# Patient Record
Sex: FEMALE | Race: WHITE | Employment: OTHER | URBAN - METROPOLITAN AREA
[De-identification: names, ages, dates, MRNs, and addresses within clinical notes are randomized per-mention and may not be internally consistent; named-entity substitution may affect disease eponyms.]

---

## 2017-04-07 ENCOUNTER — HOSPITAL ENCOUNTER (OUTPATIENT)
Dept: LAB | Age: 82
Discharge: HOME | End: 2017-04-07
Payer: MEDICARE

## 2017-04-07 ENCOUNTER — HOSPITAL ENCOUNTER (OUTPATIENT)
Dept: ULTRASOUND IMAGING | Age: 82
Discharge: HOME | End: 2017-04-07
Attending: INTERNAL MEDICINE
Payer: MEDICARE

## 2017-04-07 DIAGNOSIS — N18.30 CHRONIC KIDNEY DISEASE, STAGE III (MODERATE) (HCC): ICD-10-CM

## 2017-04-07 LAB
ALBUMIN SERPL BCP-MCNC: 3.5 G/DL (ref 3.4–5)
ANION GAP BLD CALC-SCNC: 4 MMOL/L (ref 3–18)
BUN SERPL-MCNC: 22 MG/DL (ref 7–18)
BUN/CREAT SERPL: 18 (ref 12–20)
CALCIUM SERPL-MCNC: 8.5 MG/DL (ref 8.5–10.1)
CALCIUM SERPL-MCNC: 8.7 MG/DL (ref 8.5–10.1)
CHLORIDE SERPL-SCNC: 103 MMOL/L (ref 100–108)
CO2 SERPL-SCNC: 31 MMOL/L (ref 21–32)
CREAT SERPL-MCNC: 1.23 MG/DL (ref 0.6–1.3)
GLUCOSE SERPL-MCNC: 88 MG/DL (ref 74–99)
HCT VFR BLD AUTO: 28 % (ref 35–45)
HGB BLD-MCNC: 8.5 G/DL (ref 12–16)
IRON SATN MFR SERPL: 3 %
IRON SERPL-MCNC: 13 UG/DL (ref 50–175)
PHOSPHATE SERPL-MCNC: 3.4 MG/DL (ref 2.5–4.9)
POTASSIUM SERPL-SCNC: 4.5 MMOL/L (ref 3.5–5.5)
PTH-INTACT SERPL-MCNC: 49.8 PG/ML (ref 14–72)
SODIUM SERPL-SCNC: 138 MMOL/L (ref 136–145)
TIBC SERPL-MCNC: 387 UG/DL (ref 250–450)

## 2017-04-07 PROCEDURE — 76770 US EXAM ABDO BACK WALL COMP: CPT

## 2017-04-20 ENCOUNTER — HOSPITAL ENCOUNTER (OUTPATIENT)
Dept: INFUSION THERAPY | Age: 82
Discharge: HOME | End: 2017-04-20
Payer: MEDICARE

## 2017-04-20 VITALS
HEART RATE: 75 BPM | TEMPERATURE: 98.3 F | DIASTOLIC BLOOD PRESSURE: 65 MMHG | RESPIRATION RATE: 16 BRPM | SYSTOLIC BLOOD PRESSURE: 121 MMHG | OXYGEN SATURATION: 100 %

## 2017-04-20 PROCEDURE — 96365 THER/PROPH/DIAG IV INF INIT: CPT

## 2017-04-20 PROCEDURE — 74011250636 HC RX REV CODE- 250/636: Performed by: INTERNAL MEDICINE

## 2017-04-20 PROCEDURE — 96366 THER/PROPH/DIAG IV INF ADDON: CPT

## 2017-04-20 RX ORDER — BISMUTH SUBSALICYLATE 262 MG
1 TABLET,CHEWABLE ORAL DAILY
COMMUNITY

## 2017-04-20 RX ORDER — TRAMADOL HYDROCHLORIDE 50 MG/1
50 TABLET ORAL
COMMUNITY
End: 2021-06-03

## 2017-04-20 RX ORDER — ZOLPIDEM TARTRATE 10 MG/1
10 TABLET ORAL
COMMUNITY
End: 2017-12-18

## 2017-04-20 RX ORDER — SODIUM CHLORIDE 0.9 % (FLUSH) 0.9 %
10-40 SYRINGE (ML) INJECTION AS NEEDED
Status: DISPENSED | OUTPATIENT
Start: 2017-04-20 | End: 2017-04-20

## 2017-04-20 RX ORDER — GLUCOSAMINE/CHONDR SU A SOD 750-600 MG
5000 TABLET ORAL
COMMUNITY

## 2017-04-20 RX ORDER — SODIUM CHLORIDE 9 MG/ML
250 INJECTION, SOLUTION INTRAVENOUS ONCE
Status: COMPLETED | OUTPATIENT
Start: 2017-04-20 | End: 2017-04-20

## 2017-04-20 RX ORDER — CHOLECALCIFEROL TAB 125 MCG (5000 UNIT) 125 MCG
5000 TAB ORAL DAILY
COMMUNITY

## 2017-04-20 RX ADMIN — Medication 10 ML: at 08:45

## 2017-04-20 RX ADMIN — SODIUM CHLORIDE 250 ML: 900 INJECTION, SOLUTION INTRAVENOUS at 08:55

## 2017-04-20 RX ADMIN — IRON SUCROSE 300 MG: 20 INJECTION, SOLUTION INTRAVENOUS at 09:01

## 2017-04-20 RX ADMIN — Medication 10 ML: at 11:01

## 2017-04-20 NOTE — PROGRESS NOTES
[facility] OPIC Progress Note    Date: 2017    Name: Davidosn Aguirre    MRN: [id number]         : 10/23/1931    Venofer Infusion    Ms. Mack Rowan to [facility], ambulatory, at 0830. Pt was assessed and education was provided. Education given to patient reguarding the medication, care notes were given to patient and were signed. Ms. Ghazal Murcia vitals were reviewed and patient was observed for 5 minutes prior to treatment. Visit Vitals    /57 (BP 1 Location: Right arm, BP Patient Position: Sitting)    Pulse 86    Temp 98.2 °F (36.8 °C)    Resp 16    Breastfeeding No         24 g PIV placed in right arm x 1 attempt. PIV flushed easily and had brisk blood return. NS was started at [facility] and Venofer 300 mg was initiated @ 167 ml/hr. 15 minutes into infusion, VS stable and pt denied complaints of itching, lip/tongue/facial swelling, SOB, CP or other complaints. Ms. Mack Rowan tolerated the infusion, and had no complaints. VS remained stable. PIV flushed with NS 10 ml and removed. No bleeding or hematoma noted at site. Guaze and coband applied. Reviewed discharge instructions with patient, including expected side effects (abdominal cramping, nausea, changes in color of urine or feces) and signs of allergic reaction requiring medical attention (itching/hives/rashes, SOB, chest pain, lip/tongue/facial swelling). Patient given printed copy to take home. Patient verbalized understanding of discharge instructions. Patient refused to stay for an observation period. Patient Vitals for the past 12 hrs:   Temp Pulse Resp BP SpO2   17 1100 98.3 °F (36.8 °C) 75 16 121/65 100 %   17 0833 98.2 °F (36.8 °C) 86 16 123/57 -     Patient armband removed and shredded. Ms. Mack Rowan was discharged from [place] [address] in stable condition at 1110. She is to return to [facility] on 2017 at 0800 for her next Venofer infusion.      Robbie Hoff RN  2017

## 2017-05-03 RX ORDER — SODIUM CHLORIDE 0.9 % (FLUSH) 0.9 %
10-40 SYRINGE (ML) INJECTION AS NEEDED
Status: DISPENSED | OUTPATIENT
Start: 2017-05-03 | End: 2017-05-04

## 2017-05-04 ENCOUNTER — HOSPITAL ENCOUNTER (OUTPATIENT)
Dept: INFUSION THERAPY | Age: 82
Discharge: HOME | End: 2017-05-04
Payer: MEDICARE

## 2017-05-04 VITALS
RESPIRATION RATE: 18 BRPM | SYSTOLIC BLOOD PRESSURE: 104 MMHG | DIASTOLIC BLOOD PRESSURE: 60 MMHG | HEART RATE: 71 BPM | OXYGEN SATURATION: 99 % | TEMPERATURE: 98 F

## 2017-05-04 PROCEDURE — 96366 THER/PROPH/DIAG IV INF ADDON: CPT

## 2017-05-04 PROCEDURE — 74011250636 HC RX REV CODE- 250/636: Performed by: INTERNAL MEDICINE

## 2017-05-04 PROCEDURE — 96365 THER/PROPH/DIAG IV INF INIT: CPT

## 2017-05-04 RX ORDER — SODIUM CHLORIDE 0.9 % (FLUSH) 0.9 %
10-40 SYRINGE (ML) INJECTION AS NEEDED
Status: DISCONTINUED | OUTPATIENT
Start: 2017-05-04 | End: 2017-05-08 | Stop reason: HOSPADM

## 2017-05-04 RX ADMIN — Medication 10 ML: at 09:48

## 2017-05-04 RX ADMIN — IRON SUCROSE 300 MG: 20 INJECTION, SOLUTION INTRAVENOUS at 08:15

## 2017-05-04 RX ADMIN — Medication 10 ML: at 08:12

## 2017-05-08 ENCOUNTER — HOSPITAL ENCOUNTER (OUTPATIENT)
Dept: LAB | Age: 82
Discharge: HOME | End: 2017-05-08
Payer: MEDICARE

## 2017-05-08 LAB
ALBUMIN SERPL BCP-MCNC: 3.6 G/DL (ref 3.4–5)
ANION GAP BLD CALC-SCNC: 7 MMOL/L (ref 3–18)
BUN SERPL-MCNC: 25 MG/DL (ref 7–18)
BUN/CREAT SERPL: 18 (ref 12–20)
CALCIUM SERPL-MCNC: 10 MG/DL (ref 8.5–10.1)
CALCIUM SERPL-MCNC: 9.9 MG/DL (ref 8.5–10.1)
CHLORIDE SERPL-SCNC: 103 MMOL/L (ref 100–108)
CO2 SERPL-SCNC: 30 MMOL/L (ref 21–32)
CREAT SERPL-MCNC: 1.39 MG/DL (ref 0.6–1.3)
GLUCOSE SERPL-MCNC: 88 MG/DL (ref 74–99)
HCT VFR BLD AUTO: 36.3 % (ref 35–45)
HGB BLD-MCNC: 11 G/DL (ref 12–16)
IRON SATN MFR SERPL: 17 %
IRON SERPL-MCNC: 51 UG/DL (ref 50–175)
PHOSPHATE SERPL-MCNC: 3.5 MG/DL (ref 2.5–4.9)
POTASSIUM SERPL-SCNC: 5.3 MMOL/L (ref 3.5–5.5)
PTH-INTACT SERPL-MCNC: 28.7 PG/ML (ref 14–72)
SODIUM SERPL-SCNC: 140 MMOL/L (ref 136–145)
TIBC SERPL-MCNC: 302 UG/DL (ref 250–450)

## 2017-05-08 PROCEDURE — 83550 IRON BINDING TEST: CPT | Performed by: INTERNAL MEDICINE

## 2017-05-08 PROCEDURE — 83970 ASSAY OF PARATHORMONE: CPT | Performed by: INTERNAL MEDICINE

## 2017-05-08 PROCEDURE — 80069 RENAL FUNCTION PANEL: CPT | Performed by: INTERNAL MEDICINE

## 2017-05-08 PROCEDURE — 36415 COLL VENOUS BLD VENIPUNCTURE: CPT | Performed by: INTERNAL MEDICINE

## 2017-05-08 PROCEDURE — 85018 HEMOGLOBIN: CPT | Performed by: INTERNAL MEDICINE

## 2017-05-18 ENCOUNTER — HOSPITAL ENCOUNTER (OUTPATIENT)
Dept: INFUSION THERAPY | Age: 82
Discharge: HOME | End: 2017-05-18
Payer: MEDICARE

## 2017-05-18 VITALS
DIASTOLIC BLOOD PRESSURE: 62 MMHG | SYSTOLIC BLOOD PRESSURE: 107 MMHG | RESPIRATION RATE: 18 BRPM | TEMPERATURE: 98.1 F | HEART RATE: 83 BPM | OXYGEN SATURATION: 99 %

## 2017-05-18 PROCEDURE — 96366 THER/PROPH/DIAG IV INF ADDON: CPT

## 2017-05-18 PROCEDURE — 74011250636 HC RX REV CODE- 250/636: Performed by: INTERNAL MEDICINE

## 2017-05-18 PROCEDURE — 96365 THER/PROPH/DIAG IV INF INIT: CPT

## 2017-05-18 RX ORDER — SODIUM CHLORIDE 0.9 % (FLUSH) 0.9 %
10-40 SYRINGE (ML) INJECTION AS NEEDED
Status: DISCONTINUED | OUTPATIENT
Start: 2017-05-18 | End: 2017-05-22 | Stop reason: HOSPADM

## 2017-05-18 RX ORDER — LANOLIN ALCOHOL/MO/W.PET/CERES
65 CREAM (GRAM) TOPICAL
COMMUNITY

## 2017-05-18 RX ADMIN — IRON SUCROSE 400 MG: 20 INJECTION, SOLUTION INTRAVENOUS at 08:16

## 2017-05-18 RX ADMIN — Medication 10 ML: at 08:13

## 2017-05-18 RX ADMIN — Medication 10 ML: at 10:54

## 2017-05-18 NOTE — PROGRESS NOTES
[facility] Progress Note    Date: May 18, 2017    Name: Ton Jennings    MRN: [id number]         : 10/23/1931    Venofer Infusion    Ms. Jael Adler to [facility], ambulatory, at 4481. Pt was assessed and education was provided. Education given to patient reguarding the medication. Ms. Argentina Madison vitals were reviewed and patient was observed for 5 minutes prior to treatment. Visit Vitals    /62 (BP 1 Location: Right arm, BP Patient Position: Sitting)    Pulse 83    Temp 98.1 °F (36.7 °C)    Resp 18    SpO2 99%    Breastfeeding No         24 g PIV placed in left arm x 1 attempt. PIV flushed easily and had brisk blood return. Venofer 400 mg was initiated @ 100 ml/hr and ran over 2.5 hours. 15 minutes into infusion, VS stable and pt denied complaints of itching, lip/tongue/facial swelling, SOB, CP or other complaints. Pt became very upset and said \"I'm tired of sitting here and I am ready to go now. \"  She refused to stay to receive the final 30 ml of her Venofer infusion. Ms. Jael Adler tolerated the infusion. Pt refused to have her vitals taken after the infusion was stopped prior to discharge. PIV flushed with NS 10 ml and removed. No bleeding or hematoma noted at site. Guaze and tape applied. Reviewed discharge instructions with patient, including expected side effects (abdominal cramping, nausea, changes in color of urine or feces) and signs of allergic reaction requiring medical attention (itching/hives/rashes, SOB, chest pain, lip/tongue/facial swelling). Patient given printed copy to take home. Patient verbalized understanding of discharge instructions. Patient refused to stay for an observation period. Patient armband removed and shredded. Ms. Jael Adler was discharged from [place] [address] in stable condition at 1055. She has no future appointments with [facility] at this time.      Kalli Forde RN  May 18, 2017

## 2017-09-08 ENCOUNTER — HOSPITAL ENCOUNTER (OUTPATIENT)
Dept: ULTRASOUND IMAGING | Age: 82
Discharge: HOME | End: 2017-09-08
Attending: INTERNAL MEDICINE
Payer: MEDICARE

## 2017-09-08 DIAGNOSIS — N18.30 CHRONIC KIDNEY DISEASE, STAGE III (MODERATE) (HCC): ICD-10-CM

## 2017-09-08 PROCEDURE — 76770 US EXAM ABDO BACK WALL COMP: CPT

## 2017-12-18 PROBLEM — N28.1 COMPLEX RENAL CYST: Status: ACTIVE | Noted: 2017-12-18

## 2019-09-05 ENCOUNTER — HOSPITAL ENCOUNTER (OUTPATIENT)
Dept: ULTRASOUND IMAGING | Age: 84
Discharge: HOME | End: 2019-09-05
Attending: INTERNAL MEDICINE
Payer: MEDICARE

## 2019-09-05 DIAGNOSIS — R10.9 ABDOMINAL PAIN: ICD-10-CM

## 2019-09-05 PROCEDURE — 76700 US EXAM ABDOM COMPLETE: CPT

## 2021-05-25 ENCOUNTER — HOSPITAL ENCOUNTER (INPATIENT)
Age: 86
LOS: 2 days | Discharge: PSYCHIATRIC OR SUBSTANCE USE FACILITY | DRG: 917 | End: 2021-05-27
Attending: EMERGENCY MEDICINE | Admitting: HOSPITALIST
Payer: MEDICARE

## 2021-05-25 ENCOUNTER — APPOINTMENT (OUTPATIENT)
Dept: CT IMAGING | Age: 86
DRG: 917 | End: 2021-05-25
Attending: STUDENT IN AN ORGANIZED HEALTH CARE EDUCATION/TRAINING PROGRAM
Payer: MEDICARE

## 2021-05-25 ENCOUNTER — APPOINTMENT (OUTPATIENT)
Dept: GENERAL RADIOLOGY | Age: 86
DRG: 917 | End: 2021-05-25
Attending: EMERGENCY MEDICINE
Payer: MEDICARE

## 2021-05-25 DIAGNOSIS — G93.41 ACUTE METABOLIC ENCEPHALOPATHY: ICD-10-CM

## 2021-05-25 DIAGNOSIS — M25.551 RIGHT HIP PAIN: Primary | ICD-10-CM

## 2021-05-25 DIAGNOSIS — T14.91XA SUICIDE ATTEMPT (HCC): ICD-10-CM

## 2021-05-25 LAB
ALBUMIN SERPL-MCNC: 2.9 G/DL (ref 3.4–5)
ALBUMIN/GLOB SERPL: 0.7 {RATIO} (ref 0.8–1.7)
ALP SERPL-CCNC: 73 U/L (ref 45–117)
ALT SERPL-CCNC: 15 U/L (ref 13–56)
AMPHET UR QL SCN: NEGATIVE
ANION GAP BLD CALC-SCNC: 12 MMOL/L (ref 10–20)
ANION GAP SERPL CALC-SCNC: 5 MMOL/L (ref 3–18)
APAP SERPL-MCNC: 12 UG/ML (ref 10–30)
APAP SERPL-MCNC: 4 UG/ML (ref 10–30)
APPEARANCE UR: CLEAR
APTT PPP: 31.7 SEC (ref 23–36.4)
ARTERIAL PATENCY WRIST A: ABNORMAL
AST SERPL-CCNC: 13 U/L (ref 10–38)
BACTERIA URNS QL MICRO: ABNORMAL /HPF
BARBITURATES UR QL SCN: NEGATIVE
BASE DEFICIT BLD-SCNC: 2.8 MMOL/L
BASOPHILS # BLD: 0.1 K/UL (ref 0–0.1)
BASOPHILS NFR BLD: 1 % (ref 0–2)
BDY SITE: ABNORMAL
BENZODIAZ UR QL: POSITIVE
BILIRUB SERPL-MCNC: 0.2 MG/DL (ref 0.2–1)
BILIRUB UR QL: NEGATIVE
BUN SERPL-MCNC: 15 MG/DL (ref 7–18)
BUN/CREAT SERPL: 20 (ref 12–20)
CA-I BLD-MCNC: 1.1 MMOL/L (ref 1.12–1.32)
CALCIUM SERPL-MCNC: 7.8 MG/DL (ref 8.5–10.1)
CANNABINOIDS UR QL SCN: NEGATIVE
CHLORIDE BLD-SCNC: 98 MMOL/L (ref 98–107)
CHLORIDE SERPL-SCNC: 103 MMOL/L (ref 100–111)
CO2 BLD-SCNC: 22 MMOL/L (ref 19–24)
CO2 SERPL-SCNC: 21 MMOL/L (ref 21–32)
COCAINE UR QL SCN: NEGATIVE
COLOR UR: YELLOW
CREAT BLD-MCNC: 1.04 MG/DL (ref 0.6–1.3)
CREAT SERPL-MCNC: 0.74 MG/DL (ref 0.6–1.3)
DIFFERENTIAL METHOD BLD: ABNORMAL
EOSINOPHIL # BLD: 0.1 K/UL (ref 0–0.4)
EOSINOPHIL NFR BLD: 2 % (ref 0–5)
EPITH CASTS URNS QL MICRO: ABNORMAL /LPF (ref 0–5)
ERYTHROCYTE [DISTWIDTH] IN BLOOD BY AUTOMATED COUNT: 17 % (ref 11.6–14.5)
GAS FLOW.O2 O2 DELIVERY SYS: ABNORMAL L/MIN
GLOBULIN SER CALC-MCNC: 3.9 G/DL (ref 2–4)
GLUCOSE BLD-MCNC: 89 MG/DL (ref 65–100)
GLUCOSE SERPL-MCNC: 87 MG/DL (ref 74–99)
GLUCOSE UR STRIP.AUTO-MCNC: NEGATIVE MG/DL
HCO3 BLD-SCNC: 21.3 MMOL/L (ref 22–26)
HCT VFR BLD AUTO: 31.2 % (ref 35–45)
HDSCOM,HDSCOM: ABNORMAL
HGB BLD-MCNC: 10.4 G/DL (ref 12–16)
HGB UR QL STRIP: ABNORMAL
INR PPP: 1 (ref 0.8–1.2)
KETONES UR QL STRIP.AUTO: NEGATIVE MG/DL
LACTATE BLD-SCNC: 0.4 MMOL/L (ref 0.4–2)
LEUKOCYTE ESTERASE UR QL STRIP.AUTO: NEGATIVE
LYMPHOCYTES # BLD: 0.7 K/UL (ref 0.9–3.6)
LYMPHOCYTES NFR BLD: 10 % (ref 21–52)
MAGNESIUM SERPL-MCNC: 2.2 MG/DL (ref 1.6–2.6)
MCH RBC QN AUTO: 28 PG (ref 24–34)
MCHC RBC AUTO-ENTMCNC: 33.3 G/DL (ref 31–37)
MCV RBC AUTO: 83.9 FL (ref 74–97)
METHADONE UR QL: NEGATIVE
MONOCYTES # BLD: 0.5 K/UL (ref 0.05–1.2)
MONOCYTES NFR BLD: 8 % (ref 3–10)
NEUTS SEG # BLD: 5.5 K/UL (ref 1.8–8)
NEUTS SEG NFR BLD: 79 % (ref 40–73)
NITRITE UR QL STRIP.AUTO: NEGATIVE
OPIATES UR QL: NEGATIVE
PCO2 BLD: 33.5 MMHG (ref 35–45)
PCP UR QL: NEGATIVE
PH BLD: 7.41 [PH] (ref 7.35–7.45)
PH UR STRIP: 5.5 [PH] (ref 5–8)
PHOSPHATE SERPL-MCNC: 3.6 MG/DL (ref 2.5–4.9)
PLATELET # BLD AUTO: 273 K/UL (ref 135–420)
PMV BLD AUTO: 8.5 FL (ref 9.2–11.8)
PO2 BLD: 311 MMHG (ref 80–100)
POTASSIUM BLD-SCNC: 4.5 MMOL/L (ref 3.5–5.1)
POTASSIUM SERPL-SCNC: 4.4 MMOL/L (ref 3.5–5.5)
PROT SERPL-MCNC: 6.8 G/DL (ref 6.4–8.2)
PROT UR STRIP-MCNC: NEGATIVE MG/DL
PROTHROMBIN TIME: 13.3 SEC (ref 11.5–15.2)
RBC # BLD AUTO: 3.72 M/UL (ref 4.2–5.3)
RBC #/AREA URNS HPF: ABNORMAL /HPF (ref 0–5)
SALICYLATES SERPL-MCNC: <1.7 MG/DL (ref 2.8–20)
SALICYLATES SERPL-MCNC: <1.7 MG/DL (ref 2.8–20)
SAO2 % BLD: 100 %
SERVICE CMNT-IMP: ABNORMAL
SODIUM BLD-SCNC: 131 MMOL/L (ref 136–145)
SODIUM SERPL-SCNC: 129 MMOL/L (ref 136–145)
SODIUM UR-SCNC: 59 MMOL/L (ref 20–110)
SP GR UR REFRACTOMETRY: 1.01 (ref 1–1.03)
SPECIMEN SITE: ABNORMAL
TSH SERPL DL<=0.05 MIU/L-ACNC: 0.62 UIU/ML (ref 0.36–3.74)
UROBILINOGEN UR QL STRIP.AUTO: 0.2 EU/DL (ref 0.2–1)
WBC # BLD AUTO: 6.9 K/UL (ref 4.6–13.2)

## 2021-05-25 PROCEDURE — 99223 1ST HOSP IP/OBS HIGH 75: CPT | Performed by: HOSPITALIST

## 2021-05-25 PROCEDURE — 83935 ASSAY OF URINE OSMOLALITY: CPT

## 2021-05-25 PROCEDURE — 99285 EMERGENCY DEPT VISIT HI MDM: CPT

## 2021-05-25 PROCEDURE — 85610 PROTHROMBIN TIME: CPT

## 2021-05-25 PROCEDURE — 80143 DRUG ASSAY ACETAMINOPHEN: CPT

## 2021-05-25 PROCEDURE — 85730 THROMBOPLASTIN TIME PARTIAL: CPT

## 2021-05-25 PROCEDURE — 84295 ASSAY OF SERUM SODIUM: CPT

## 2021-05-25 PROCEDURE — 93005 ELECTROCARDIOGRAM TRACING: CPT

## 2021-05-25 PROCEDURE — 74011250636 HC RX REV CODE- 250/636: Performed by: EMERGENCY MEDICINE

## 2021-05-25 PROCEDURE — 81001 URINALYSIS AUTO W/SCOPE: CPT

## 2021-05-25 PROCEDURE — 65660000004 HC RM CVT STEPDOWN

## 2021-05-25 PROCEDURE — 83735 ASSAY OF MAGNESIUM: CPT

## 2021-05-25 PROCEDURE — APPSS60 APP SPLIT SHARED TIME 46-60 MINUTES: Performed by: NURSE PRACTITIONER

## 2021-05-25 PROCEDURE — 84100 ASSAY OF PHOSPHORUS: CPT

## 2021-05-25 PROCEDURE — 77010033678 HC OXYGEN DAILY

## 2021-05-25 PROCEDURE — 71045 X-RAY EXAM CHEST 1 VIEW: CPT

## 2021-05-25 PROCEDURE — 85025 COMPLETE CBC W/AUTO DIFF WBC: CPT

## 2021-05-25 PROCEDURE — 80053 COMPREHEN METABOLIC PANEL: CPT

## 2021-05-25 PROCEDURE — 94762 N-INVAS EAR/PLS OXIMTRY CONT: CPT

## 2021-05-25 PROCEDURE — 80179 DRUG ASSAY SALICYLATE: CPT

## 2021-05-25 PROCEDURE — 2709999900 HC NON-CHARGEABLE SUPPLY

## 2021-05-25 PROCEDURE — 70450 CT HEAD/BRAIN W/O DYE: CPT

## 2021-05-25 PROCEDURE — 74011250636 HC RX REV CODE- 250/636: Performed by: NURSE PRACTITIONER

## 2021-05-25 PROCEDURE — 83930 ASSAY OF BLOOD OSMOLALITY: CPT

## 2021-05-25 PROCEDURE — 84443 ASSAY THYROID STIM HORMONE: CPT

## 2021-05-25 PROCEDURE — 84300 ASSAY OF URINE SODIUM: CPT

## 2021-05-25 PROCEDURE — 80307 DRUG TEST PRSMV CHEM ANLYZR: CPT

## 2021-05-25 PROCEDURE — 36600 WITHDRAWAL OF ARTERIAL BLOOD: CPT

## 2021-05-25 RX ORDER — SODIUM CHLORIDE 0.9 % (FLUSH) 0.9 %
5-40 SYRINGE (ML) INJECTION EVERY 8 HOURS
Status: DISCONTINUED | OUTPATIENT
Start: 2021-05-25 | End: 2021-05-27 | Stop reason: HOSPADM

## 2021-05-25 RX ORDER — ACETAMINOPHEN 650 MG/1
650 SUPPOSITORY RECTAL
Status: DISCONTINUED | OUTPATIENT
Start: 2021-05-25 | End: 2021-05-27 | Stop reason: HOSPADM

## 2021-05-25 RX ORDER — ENOXAPARIN SODIUM 100 MG/ML
40 INJECTION SUBCUTANEOUS DAILY
Status: DISCONTINUED | OUTPATIENT
Start: 2021-05-26 | End: 2021-05-27 | Stop reason: HOSPADM

## 2021-05-25 RX ORDER — ACETAMINOPHEN 325 MG/1
650 TABLET ORAL
Status: DISCONTINUED | OUTPATIENT
Start: 2021-05-25 | End: 2021-05-27 | Stop reason: HOSPADM

## 2021-05-25 RX ORDER — HYDRALAZINE HYDROCHLORIDE 20 MG/ML
10 INJECTION INTRAMUSCULAR; INTRAVENOUS
Status: DISCONTINUED | OUTPATIENT
Start: 2021-05-25 | End: 2021-05-27 | Stop reason: HOSPADM

## 2021-05-25 RX ORDER — ONDANSETRON 2 MG/ML
4 INJECTION INTRAMUSCULAR; INTRAVENOUS
Status: DISCONTINUED | OUTPATIENT
Start: 2021-05-25 | End: 2021-05-27 | Stop reason: HOSPADM

## 2021-05-25 RX ORDER — SODIUM CHLORIDE 9 MG/ML
125 INJECTION, SOLUTION INTRAVENOUS CONTINUOUS
Status: DISCONTINUED | OUTPATIENT
Start: 2021-05-25 | End: 2021-05-26

## 2021-05-25 RX ORDER — SODIUM CHLORIDE 0.9 % (FLUSH) 0.9 %
5-40 SYRINGE (ML) INJECTION AS NEEDED
Status: DISCONTINUED | OUTPATIENT
Start: 2021-05-25 | End: 2021-05-27 | Stop reason: HOSPADM

## 2021-05-25 RX ORDER — POLYETHYLENE GLYCOL 3350 17 G/17G
17 POWDER, FOR SOLUTION ORAL DAILY PRN
Status: DISCONTINUED | OUTPATIENT
Start: 2021-05-25 | End: 2021-05-27 | Stop reason: HOSPADM

## 2021-05-25 RX ORDER — PROMETHAZINE HYDROCHLORIDE 12.5 MG/1
12.5 TABLET ORAL
Status: DISCONTINUED | OUTPATIENT
Start: 2021-05-25 | End: 2021-05-27 | Stop reason: HOSPADM

## 2021-05-25 RX ADMIN — SODIUM CHLORIDE 1000 ML: 900 INJECTION, SOLUTION INTRAVENOUS at 14:57

## 2021-05-25 RX ADMIN — SODIUM CHLORIDE 125 ML/HR: 900 INJECTION, SOLUTION INTRAVENOUS at 16:09

## 2021-05-25 RX ADMIN — HYDRALAZINE HYDROCHLORIDE 10 MG: 20 INJECTION, SOLUTION INTRAMUSCULAR; INTRAVENOUS at 20:08

## 2021-05-25 NOTE — ED NOTES
Patient appears to be more calm when staff is not working on her. Staff needs to help her stay covered with blankets.  No family at the bedside at this time

## 2021-05-25 NOTE — ED NOTES
Notified Dr. Angela Danielle that patient has 3 visitors at bedside d/t family needing to discuss patient's code status.

## 2021-05-25 NOTE — ED PROVIDER NOTES
80-year-old female with past medical history of breast cancer and chronic lower extremity pain brought to the emergency department via EMS after her family called secondary to the patient calling her daughter-in-law and stating that she felt as though she was going to die and would not make it. EMS arrived on scene and reports that patient was sitting in a chair unresponsive with all of her medications lined up on the table next to her and a note stating that she can no longer take the pain and that what she has been given in terms of medications is no longer helping; take care of her animal and if possible to rehome it with an older person. Patient was initially bagged on scene secondary to respiratory depression. Patient was given 1 mg IN in Narcan and 1 mg IV Narcan in route with minimal improvement in her responsiveness. Patient arrived in the emergency department on 15 L nonrebreather with GCS of 7. Past Medical History:   Diagnosis Date    Arthritis     Asthma     Cancer ([facility] [address]     hx of breast cancer    Disorder     scoliosis    Other ill-defined conditions(799.89)     osteoporosis,        Past Surgical History:   Procedure Laterality Date    BREAST SURGERY PROCEDURE UNLISTED      mastectomy - left    HX HEENT      cataract surgery with lens replacement         Family History:   Problem Relation Age of Onset    Cancer Neg Hx     Diabetes Neg Hx     Heart Disease Neg Hx     Hypertension Neg Hx     Stroke Neg Hx        Social History     Socioeconomic History    Marital status:      Spouse name: Not on file    Number of children: Not on file    Years of education: Not on file    Highest education level: Not on file   Occupational History    Not on file   Tobacco Use    Smoking status: Never Smoker    Smokeless tobacco: Never Used   Substance and Sexual Activity    Alcohol use: Yes     Alcohol/week: 0.8 standard drinks     Types: 1 Glasses of wine per week    Drug use:  No  Sexual activity: Not on file   Other Topics Concern    Not on file   Social History Narrative    Not on file     Social Determinants of Health     Financial Resource Strain:     Difficulty of Paying Living Expenses:    Food Insecurity:     Worried About Running Out of Food in the Last Year:     [address] in the Last Year:    Transportation Needs:     Lack of Transportation (Medical):  Lack of Transportation (Non-Medical):    Physical Activity:     Days of Exercise per Week:     Minutes of Exercise per Session:    Stress:     Feeling of Stress :    Social Connections:     Frequency of Communication with Friends and Family:     Frequency of Social Gatherings with Friends and Family:     Attends Sabianist Services:     Active Member of Clubs or Organizations:     Attends Club or Organization Meetings:     Marital Status:    Intimate Partner Violence:     Fear of Current or Ex-Partner:     Emotionally Abused:     Physically Abused:     Sexually Abused: ALLERGIES: Sulfa (sulfonamide antibiotics)    Review of Systems   Unable to perform ROS: Mental status change       There were no vitals filed for this visit. Physical Exam  Vitals reviewed. Constitutional:       General: She is not in acute distress. Appearance: She is underweight. She is not ill-appearing, toxic-appearing or diaphoretic. Interventions: Face mask in place. HENT:      Head: Normocephalic and atraumatic. Right Ear: External ear normal.      Left Ear: External ear normal.      Nose: Nose normal.      Mouth/Throat:      Mouth: Mucous membranes are moist.      Pharynx: Oropharynx is clear. Eyes:      General: Lids are normal. No scleral icterus. Conjunctiva/sclera: Conjunctivae normal.      Pupils:      Right eye: Pupil is not reactive. Left eye: Pupil is not reactive. Comments: Pupils 1mm b/l   Neck:      Vascular: No JVD.    Cardiovascular:      Rate and Rhythm: Normal rate and regular rhythm. Pulses: Normal pulses. Heart sounds: Normal heart sounds. No murmur heard. No friction rub. No gallop. Pulmonary:      Effort: Tachypnea present. Breath sounds: Normal breath sounds. No stridor. No wheezing, rhonchi or rales. Chest:      Comments: B/l breast replacements  Abdominal:      General: Abdomen is flat. Bowel sounds are normal.      Palpations: Abdomen is soft. Tenderness: There is no abdominal tenderness. Musculoskeletal:      Right lower leg: No edema. Left lower leg: No edema. Skin:     General: Skin is warm and dry. Findings: Bruising present. Neurological:      Mental Status: She is unresponsive. GCS: GCS eye subscore is 1. GCS verbal subscore is 1. GCS motor subscore is 5. MDM  Number of Diagnoses or Management Options  Diagnosis management comments: History of breast cancer and chronic pain presenting to the ED via EMS after being found unresponsive at home with questionable suicidal intent. Patient arrived in the ED on 15 L nonrebreather, with oxygen saturation at 100%. Vital signs on presentation were notable for mild hypertension but were otherwise unremarkable. Physical exam as documented elsewhere, however was notable for an unresponsive patient with pinpoint pupils. EKG showed normal sinus rhythm at a rate of 85. Normal axis. Appropriate intervals. No evidence of acute infarction or ischemia. ABG was notable for hyperoxia and nonrebreather was transitioned to nasal cannula. Chest x-ray showed no evidence of acute cardiopulmonary processes. Head CT showed no evidence of acute intracranial pathology. Laboratory evaluation was notable for anemia of unknown chronicity with hemoglobin of 10, hyponatremia to 394, salicylate was 1.7, acetaminophen was 12. Discussion was had with the son about patient's living will which was brought to the emergency department.   Living will was discussed with risk-management who states that the living will is nonspecific enough to make the patient DNR/DNI. Son states that his mother would not want to be resuscitated or intubated, paperwork was signed for patient to be made DNR/DNI. Poison center was contacted in regards to patient's polypharmacy overdose. Their recommendations were followed. Patient was admitted to stepdown unit for further work-up and evaluation. Amount and/or Complexity of Data Reviewed  Clinical lab tests: ordered and reviewed  Tests in the radiology section of CPT®: ordered and reviewed  Tests in the medicine section of CPT®: ordered and reviewed  Decide to obtain previous medical records or to obtain history from someone other than the patient: yes  Obtain history from someone other than the patient: yes  Review and summarize past medical records: yes  Independent visualization of images, tracings, or specimens: yes    Risk of Complications, Morbidity, and/or Mortality  Presenting problems: high  Diagnostic procedures: high  Management options: high    Critical Care  Total time providing critical care: 30-74 minutes    Patient Progress  Patient progress: stable    ED Course as of May 25 1909   Tue May 25, 2021   1508 Anemia noted. Last lab value was 4y ago, which was similar. Unclear at this time if pt is chronically anemic. CBC WITH AUTOMATED DIFF(!):    WBC 6.9   RBC 3.72(!)   HGB 10.4(!)   HCT 31.2(!)   MCV 83.9   MCH 28.0   MCHC 33.3   RDW 17.0(!)   PLATELET 365   MPV 8.5(!)   NEUTROPHILS 79(!)   LYMPHOCYTES 10(!)   MONOCYTES 8   EOSINOPHILS 2   BASOPHILS 1   ABS. NEUTROPHILS 5.5   ABS. LYMPHOCYTES 0.7(!)   ABS. MONOCYTES 0.5   ABS. EOSINOPHILS 0.1   ABS. BASOPHILS 0.1   DF AUTOMATED [CM]   1508 ABG reviewed and interpreted. O2 via NRB stopped. Pt transitioned to NC with ETCO2 monitoring.     BLOOD GAS,CHEM8,LACTIC ACID POC(!):    pH (POC) 7.41   pCO2 (POC) 33.5(!)   pO2 (POC) 311(!)   Calcium, ionized (POC) 1.10(!)   Base deficit (POC) 2.8   HCO3 (POC) 21.3(!)   CO2, POC 22   O2    Sample source ARTERIAL   SITE RIGHT BRACHIAL   SARAH'S TEST NOT APPLICABLE   Device: Non rebreather   Performed by [place]   Sodium (POC) 131(!)   Potassium (POC) 4.5   GLUCOSE,FAST - POC 89   Creatinine, POC 1.04   Lactic Acid (POC) 0.40   Chloride, POC 98   Anion gap, POC 12   GFRAA, POC >60   GFRNA, POC 50(!) [CM]   1518 Chest x-ray independently viewed and interpreted. No acute cardiopulmonary processes noted. XR CHEST SNGL V [CM]   1523 BMP reviewed. Hyponatremia noted. METABOLIC PANEL, COMPREHENSIVE(!):    Sodium 129(!)   Potassium 4.4   Chloride 103   CO2 21   Anion gap 5   Glucose 87   BUN 15   Creatinine 0.74   BUN/Creatinine ratio 20   GFR est AA >60   GFR est non-AA >60   Calcium 7.8(!)   Bilirubin, total 0.2   ALT 15   AST 13   Alk. phosphatase 73   Protein, total 6.8   Albumin 2.9(!)   Globulin 3.9   A-G Ratio 0.7(!) [CM]   80 Spoke with poison control regarding the patient. Given that rough estimated time of ingestion was 1300 today based off of when patient called her daughter-in-law they recommend repeating acetaminophen and salicylate levels at 5747. They advised supportive care and no N-acetylcysteine at this time.    [CM]   1625 Head CT independently viewed and interpreted. Significant motion artifact, however no obvious intracranial hemorrhage. No interval change from previous study. Agree with radiologist read.    CT HEAD WO CONT [CM]   3568 Hospitalist paged for admission to stepdown.     [CM]      ED Course User Index  [CM] Stacie Orr MD       Procedures

## 2021-05-25 NOTE — ED TRIAGE NOTES
Per EMS- Patient has SI. Called family to say goodbye and left a note. Shallow respirations. Unresponsive for EMS. Minimally responsive to pain for this RN. Patient took xanax, tramadol, and Ambien. EMS administered 2 mg narcan and patient became minimally responsive.

## 2021-05-25 NOTE — ED NOTES
Added patient's son, daughter in law, and granddaughters phone numbers into chart. Patient's daughter in law, Shani Pool, is to be the first point of contact for any updates/questions.

## 2021-05-25 NOTE — ED NOTES
Bedside and Verbal shift change report given to [place] (oncoming nurse) by Huseyin Sanchez RN (offgoing nurse). Report included the following information SBAR, Kardex, Intake/Output, MAR, Recent Results and Med Rec Status.

## 2021-05-25 NOTE — H&P
History & Physical    Patient: Anamaria Laguerre MRN: [id number]  CSN: [id number]    YOB: 1931  Age: 80 y.o. Sex: female      DOA: 5/25/2021    Chief Complaint:   Chief Complaint   Patient presents with    Drug Overdose          HPI:     Anamaria Laguerre is a 80 y.o.  female with hx of Breast CA s/p left mastectomy, chronic lower extremity pain, arthritis, scoliosis, osteoporosis and asthma who was brought to the ED via EMS for drug overdose. Per review of note, pt called her daughter in law and stated she felt as though she was going to die and would not make it. EMS arrived on scene and reports pt was sitting in chair unresponsive with all of her medications lined on the table with a note stating she can no longer take the pain and pain meds are no longer helping; also for someone to take care of her animal and possible to rehome with an older person. Pt was initially bagged on scene secondary to respiratory depression. She was given Narcan 1 mg IM and 1 mg IV en route with minimal improvement in her responsiveness. She ws on 15L NC but transitioned to NC. Work up in the ED with Hgb 10.4, Hct 31.2, sodium 129, BUN 15, creatinine 0.74, ALT 15, AST 13, acetaminophen level 12, salicylate level <8.8, UDS +benzodiazepines. CT head no acute intracranial process. ABG with pH 7.41, pCO2 of 33.5, pO2 of 311, HCO3 of 21.3 on NRBM. ED provider spoke with poison control and does not recommend N-acetylcysteine at this time, recommend supportive care. She has been made DO NOT RESUCITATE status. Pt is resting in bed, not following commands, does not open eyes, pulling at linen/gowns, holding on to side rails. Spoke with son over the phone briefly but unable to confirm medication history. Discussed with RN. She will be admitted to stepdown unit for further management of care.     Past Medical History:   Diagnosis Date    Arthritis     Asthma     Cancer ([facility] [address]     hx of breast cancer    Disorder     scoliosis    Other ill-defined conditions(799.89)     osteoporosis,        Past Surgical History:   Procedure Laterality Date    BREAST SURGERY PROCEDURE UNLISTED      mastectomy - left    HX HEENT      cataract surgery with lens replacement       Family History   Problem Relation Age of Onset    Cancer Neg Hx     Diabetes Neg Hx     Heart Disease Neg Hx     Hypertension Neg Hx     Stroke Neg Hx        Social History     Socioeconomic History    Marital status:      Spouse name: Not on file    Number of children: Not on file    Years of education: Not on file    Highest education level: Not on file   Tobacco Use    Smoking status: Never Smoker    Smokeless tobacco: Never Used   Substance and Sexual Activity    Alcohol use: Yes     Alcohol/week: 0.8 standard drinks     Types: 1 Glasses of wine per week    Drug use: No     Social Determinants of Health     Financial Resource Strain:     Difficulty of Paying Living Expenses:    Food Insecurity:     Worried About Running Out of Food in the Last Year:     [address] in the Last Year:    Transportation Needs:     Lack of Transportation (Medical):  Lack of Transportation (Non-Medical):    Physical Activity:     Days of Exercise per Week:     Minutes of Exercise per Session:    Stress:     Feeling of Stress :    Social Connections:     Frequency of Communication with Friends and Family:     Frequency of Social Gatherings with Friends and Family:     Attends Restoration Services:     Active Member of Clubs or Organizations:     Attends Club or Organization Meetings:     Marital Status:        Prior to Admission medications    Medication Sig Start Date End Date Taking? Authorizing Provider   zolpidem (AMBIEN) 10 mg tablet Take  by mouth nightly as needed for Sleep.     Provider, Historical   esomeprazole (NEXIUM) 40 mg capsule  10/2/17   Provider, Historical   lidocaine (LIDODERM) 5 %  9/21/17   Provider, Historical suvorexant (BELSOMRA) 5 mg tablet Take  by mouth nightly as needed for Insomnia. Provider, Historical   ferrous sulfate (IRON) 325 mg (65 mg iron) tablet Take 65 mg by mouth Daily (before breakfast). Provider, Historical   traMADol (ULTRAM) 50 mg tablet Take 50 mg by mouth every six (6) hours as needed for Pain. Provider, Historical   beclomethasone (QVAR) 40 mcg/actuation aero Take 1 Puff by inhalation two (2) times a day. Provider, Historical   denosumab (PROLIA) 60 mg/mL injection 60 mg by SubCUTAneous route every 6 months. Provider, Historical   cholecalciferol, VITAMIN D3, (VITAMIN D3) 5,000 unit tab tablet Take 5,000 Units by mouth daily. Provider, Historical   Biotin 2,500 mcg cap Take 5,000 mcg by mouth. Provider, Historical   multivitamin (ONE A DAY) tablet Take 1 Tab by mouth daily. Provider, Historical   ibandronate (BONIVA) 150 mg tablet Take 150 mg by mouth every thirty (30) days. OtherRupert MD   aspirin 81 mg tablet Take 81 mg by mouth. Rupert Alvarez MD   calcium 500 mg Tab Take 1,500 Units by mouth daily. Rupert Alvarez MD   esomeprazole (NEXIUM) 20 mg capsule Take 40 mg by mouth daily. Rupert Alvarez MD   ALPRAZolam Willow Tejada) 0.5 mg tablet Take 0.5 mg by mouth daily. Rupert Alvarez MD       Allergies   Allergen Reactions    Sulfa (Sulfonamide Antibiotics) Unknown (comments)         Unable to obtain Review of Systems due to pt condition        Physical Exam:     Physical Exam:  Visit Vitals  BP (!) 167/87   Pulse 88   Resp 15   SpO2 100%             General:   Resting in bed, pulling at gown/linen. No acute distress. Head: Normocephalic, without obvious abnormality, atraumatic. Eyes:  Conjunctivae/corneas clear. Nose: Nares normal. No drainage or sinus tenderness. Neck: Supple, symmetrical, trachea midline, no adenopathy, thyroid: no enlargement, no carotid bruit and no JVD. Lungs:   Clear to auscultation bilaterally.    Heart:  Regular rate and rhythm, S1, S2 normal.     Abdomen: Soft, non-tender. Bowel sounds normal.    Extremities: Extremities normal, atraumatic, no cyanosis or edema. Pulses: 2+ and symmetric all extremities. Skin:  No rashes or lesions   Neurologic: CAREN due to condition, does not open eyes, does not follow commands, pulling at linen, gown. Labs Reviewed: All lab results for the last 24 hours reviewed. Recent Results (from the past 24 hour(s))   CBC WITH AUTOMATED DIFF    Collection Time: 05/25/21  2:45 PM   Result Value Ref Range    WBC 6.9 4.6 - 13.2 K/uL    RBC 3.72 (L) 4.20 - 5.30 M/uL    HGB 10.4 (L) 12.0 - 16.0 g/dL    HCT 31.2 (L) 35.0 - 45.0 %    MCV 83.9 74.0 - 97.0 FL    MCH 28.0 24.0 - 34.0 PG    MCHC 33.3 31.0 - 37.0 g/dL    RDW 17.0 (H) 11.6 - 14.5 %    PLATELET 976 616 - 859 K/uL    MPV 8.5 (L) 9.2 - 11.8 FL    NEUTROPHILS 79 (H) 40 - 73 %    LYMPHOCYTES 10 (L) 21 - 52 %    MONOCYTES 8 3 - 10 %    EOSINOPHILS 2 0 - 5 %    BASOPHILS 1 0 - 2 %    ABS. NEUTROPHILS 5.5 1.8 - 8.0 K/UL    ABS. LYMPHOCYTES 0.7 (L) 0.9 - 3.6 K/UL    ABS. MONOCYTES 0.5 0.05 - 1.2 K/UL    ABS. EOSINOPHILS 0.1 0.0 - 0.4 K/UL    ABS. BASOPHILS 0.1 0.0 - 0.1 K/UL    DF AUTOMATED     METABOLIC PANEL, COMPREHENSIVE    Collection Time: 05/25/21  2:45 PM   Result Value Ref Range    Sodium 129 (L) 136 - 145 mmol/L    Potassium 4.4 3.5 - 5.5 mmol/L    Chloride 103 100 - 111 mmol/L    CO2 21 21 - 32 mmol/L    Anion gap 5 3.0 - 18 mmol/L    Glucose 87 74 - 99 mg/dL    BUN 15 7.0 - 18 MG/DL    Creatinine 0.74 0.6 - 1.3 MG/DL    BUN/Creatinine ratio 20 12 - 20      GFR est AA >60 >60 ml/min/1.73m2    GFR est non-AA >60 >60 ml/min/1.73m2    Calcium 7.8 (L) 8.5 - 10.1 MG/DL    Bilirubin, total 0.2 0.2 - 1.0 MG/DL    ALT (SGPT) 15 13 - 56 U/L    AST (SGOT) 13 10 - 38 U/L    Alk.  phosphatase 73 45 - 117 U/L    Protein, total 6.8 6.4 - 8.2 g/dL    Albumin 2.9 (L) 3.4 - 5.0 g/dL    Globulin 3.9 2.0 - 4.0 g/dL    A-G Ratio 0.7 (L) 0.8 - 1.7 PROTHROMBIN TIME + INR    Collection Time: 05/25/21  2:45 PM   Result Value Ref Range    Prothrombin time 13.3 11.5 - 15.2 sec    INR 1.0 0.8 - 1.2     PTT    Collection Time: 05/25/21  2:45 PM   Result Value Ref Range    aPTT 31.7 23.0 - 36.4 SEC   ACETAMINOPHEN    Collection Time: 05/25/21  2:45 PM   Result Value Ref Range    Acetaminophen level 12 10.0 - 54.4 ug/mL   SALICYLATE    Collection Time: 05/25/21  2:45 PM   Result Value Ref Range    Salicylate level <1.5 (L) 2.8 - 20.0 MG/DL   TSH 3RD GENERATION    Collection Time: 05/25/21  2:45 PM   Result Value Ref Range    TSH 0.62 0.36 - 3.74 uIU/mL   MAGNESIUM    Collection Time: 05/25/21  2:45 PM   Result Value Ref Range    Magnesium 2.2 1.6 - 2.6 mg/dL   PHOSPHORUS    Collection Time: 05/25/21  2:45 PM   Result Value Ref Range    Phosphorus 3.6 2.5 - 4.9 MG/DL   BLOOD GAS,CHEM8,LACTIC ACID POC    Collection Time: 05/25/21  2:46 PM   Result Value Ref Range    pH (POC) 7.41 7.35 - 7.45      pCO2 (POC) 33.5 (L) 35.0 - 45.0 MMHG    pO2 (POC) 311 (H) 80 - 100 MMHG    Calcium, ionized (POC) 1.10 (L) 1.12 - 1.32 mmol/L    Base deficit (POC) 2.8 mmol/L    HCO3 (POC) 21.3 (L) 22 - 26 MMOL/L    CO2, POC 22 19 - 24 MMOL/L    O2  %    Sample source ARTERIAL      SITE RIGHT BRACHIAL      SARAH'S TEST NOT APPLICABLE      Device: Non rebreather      Performed by Unknown Docker     Sodium (POC) 131 (L) 136 - 145 mmol/L    Potassium (POC) 4.5 3.5 - 5.1 mmol/L    Glucose (POC) 89 65 - 100 mg/dL    Creatinine (POC) 1.04 0.6 - 1.3 mg/dL    Lactic Acid (POC) 0.40 0.40 - 2.00 mmol/L    Chloride (POC) 98 98 - 107 mmol/L    Anion gap, POC 12 10 - 20      GFRAA, POC >60 >60 ml/min/1.73m2    GFRNA, POC 50 (L) >60 ml/min/1.73m2   DRUG SCREEN, URINE    Collection Time: 05/25/21  5:25 PM   Result Value Ref Range    BENZODIAZEPINES Positive (A) NEG      BARBITURATES Negative NEG      THC (TH-CANNABINOL) Negative NEG      OPIATES Negative NEG      PCP(PHENCYCLIDINE) Negative NEG COCAINE Negative NEG      AMPHETAMINES Negative NEG      METHADONE Negative NEG      HDSCOM (NOTE)    URINALYSIS W/ RFLX MICROSCOPIC    Collection Time: 05/25/21  5:46 PM   Result Value Ref Range    Color YELLOW      Appearance CLEAR      Specific gravity 1.006 1.005 - 1.030      pH (UA) 5.5 5.0 - 8.0      Protein Negative NEG mg/dL    Glucose Negative NEG mg/dL    Ketone Negative NEG mg/dL    Bilirubin Negative NEG      Blood SMALL (A) NEG      Urobilinogen 0.2 0.2 - 1.0 EU/dL    Nitrites Negative NEG      Leukocyte Esterase Negative NEG          XR results pending 5/25/2021    CT Results  (Last 48 hours)               05/25/21 1548  CT HEAD WO CONT Final result    Impression:                 1.  No acute intracranial process. 2.  Chronic small vessel ischemic changes. 3.  No significant interval change. Narrative:  EXAM:  CT Head without Contrast                CLINICAL INDICATION:  Altered mental status. Found unresponsive. Minimally   responsive to pain. COMPARISON:  08/30/12             TECHNIQUE:         - Helical volumetric CT imaging of the head is performed from the base of the   skull to the vertex without IV contrast administration. Axial, coronal and   sagittal reconstruction images are generated from this volumetric data set. - Dose optimization techniques are utilized as appropriate to the performed exam   with combination of automated exposure control, adjustment of mA and/or kV   according to patient size, and use of iterative reconstructive technique. FINDINGS:        Brain:       - Hemorrhage/ hematoma:  No evidence of intracranial hemorrhage or hematoma is   detected. - Mass:  No definite space-occupying lesion is apparent. No significant mass   effect such as midline shift or sulcal effacement. - Infarct:  No convincing evidence of acute infarct is noted.    - Gray-white matter differentiation:  There are mild to moderate periventricular decreased white matter attenuation changes, suggestive of chronic small vessel   ischemia. CSF spaces:  No acute abnormalities. Calvarium:  Intact. Sinuses:  Left maxillary sinus mucus debris along the dependent aspect. Interval assessment:  No significant interval changes are observed. Procedures/imaging: see electronic medical records for all procedures/Xrays and details which were not copied into this note but were reviewed prior to creation of Plan      Assessment/Plan        Assessment  1. Acute metabolic encephalopathy  2. Suicide attempt  3. Hyponatremia  4. Elevated blood pressure  5. Chronic lower extremity pain  6. Arthritis  7. Hx Breast CA s/p left mastectomy  8. Advanced age          Plan  1. Admit to stepdown, cardiac monitoring  2. NPO, IVFs. Serum osmolality pending. Random urine sodium normal.   3. Poison control has been notified by ED provider, recommend supportive care. 4. Monitor labs daily  5. Hydralazine IV prn parameters  6. Psych consult, please contact in am or when pt more awake/alert  7. Monitor vital signs, weight, I/Os per unit protocol  8. PT, OT eval and treat  9. Fall, aspiration and suicide precautions  10. Consult CM to assist w/ dc planning          Diet: NPO  DVT/GI Prophylaxis: Lovenox  Code Status: DNR    Contact: son Perla Gallo [phone] spoke with son at 6:43 pm to update on hospitalization and plan of care. He was not able to confirm pt's home medications. All questions answered. Disclaimer: Sections of this note are dictated using utilizing voice recognition software. Minor typographical errors may be present. If questions arise, please do not hesitate to contact me or call our department.         Courtney Thomas NP-C  [address] [facility]  pager [phone]

## 2021-05-26 ENCOUNTER — APPOINTMENT (OUTPATIENT)
Dept: GENERAL RADIOLOGY | Age: 86
DRG: 917 | End: 2021-05-26
Attending: HOSPITALIST
Payer: MEDICARE

## 2021-05-26 PROBLEM — E43 SEVERE PROTEIN-CALORIE MALNUTRITION (HCC): Status: ACTIVE | Noted: 2021-05-26

## 2021-05-26 LAB
ALBUMIN SERPL-MCNC: 3 G/DL (ref 3.4–5)
ALBUMIN/GLOB SERPL: 0.8 {RATIO} (ref 0.8–1.7)
ALP SERPL-CCNC: 84 U/L (ref 45–117)
ALT SERPL-CCNC: 15 U/L (ref 13–56)
ANION GAP SERPL CALC-SCNC: 7 MMOL/L (ref 3–18)
AST SERPL-CCNC: 19 U/L (ref 10–38)
ATRIAL RATE: 87 BPM
BASOPHILS # BLD: 0 K/UL (ref 0–0.1)
BASOPHILS NFR BLD: 0 % (ref 0–2)
BILIRUB SERPL-MCNC: 0.2 MG/DL (ref 0.2–1)
BUN SERPL-MCNC: 10 MG/DL (ref 7–18)
BUN/CREAT SERPL: 14 (ref 12–20)
CALCIUM SERPL-MCNC: 8.1 MG/DL (ref 8.5–10.1)
CALCULATED P AXIS, ECG09: 75 DEGREES
CALCULATED R AXIS, ECG10: 55 DEGREES
CALCULATED T AXIS, ECG11: 58 DEGREES
CHLORIDE SERPL-SCNC: 105 MMOL/L (ref 100–111)
CO2 SERPL-SCNC: 22 MMOL/L (ref 21–32)
CREAT SERPL-MCNC: 0.7 MG/DL (ref 0.6–1.3)
DIAGNOSIS, 93000: NORMAL
DIFFERENTIAL METHOD BLD: ABNORMAL
EOSINOPHIL # BLD: 0.1 K/UL (ref 0–0.4)
EOSINOPHIL NFR BLD: 1 % (ref 0–5)
ERYTHROCYTE [DISTWIDTH] IN BLOOD BY AUTOMATED COUNT: 17.6 % (ref 11.6–14.5)
GLOBULIN SER CALC-MCNC: 4 G/DL (ref 2–4)
GLUCOSE BLD STRIP.AUTO-MCNC: 101 MG/DL (ref 70–110)
GLUCOSE SERPL-MCNC: 77 MG/DL (ref 74–99)
HCT VFR BLD AUTO: 36.9 % (ref 35–45)
HGB BLD-MCNC: 11.9 G/DL (ref 12–16)
LYMPHOCYTES # BLD: 0.6 K/UL (ref 0.9–3.6)
LYMPHOCYTES NFR BLD: 6 % (ref 21–52)
MCH RBC QN AUTO: 27.7 PG (ref 24–34)
MCHC RBC AUTO-ENTMCNC: 32.2 G/DL (ref 31–37)
MCV RBC AUTO: 86 FL (ref 74–97)
MONOCYTES # BLD: 0.7 K/UL (ref 0.05–1.2)
MONOCYTES NFR BLD: 6 % (ref 3–10)
NEUTS SEG # BLD: 9.5 K/UL (ref 1.8–8)
NEUTS SEG NFR BLD: 86 % (ref 40–73)
OSMOLALITY SERPL: 272 MOSMOL/KG (ref 280–301)
OSMOLALITY UR: 184 MOSMOL/KG
P-R INTERVAL, ECG05: 154 MS
PLATELET # BLD AUTO: 345 K/UL (ref 135–420)
PMV BLD AUTO: 8.8 FL (ref 9.2–11.8)
POTASSIUM SERPL-SCNC: 4.1 MMOL/L (ref 3.5–5.5)
PROT SERPL-MCNC: 7 G/DL (ref 6.4–8.2)
Q-T INTERVAL, ECG07: 356 MS
QRS DURATION, ECG06: 58 MS
QTC CALCULATION (BEZET), ECG08: 428 MS
RBC # BLD AUTO: 4.29 M/UL (ref 4.2–5.3)
SODIUM SERPL-SCNC: 134 MMOL/L (ref 136–145)
VENTRICULAR RATE, ECG03: 87 BPM
WBC # BLD AUTO: 11.1 K/UL (ref 4.6–13.2)

## 2021-05-26 PROCEDURE — 99221 1ST HOSP IP/OBS SF/LOW 40: CPT | Performed by: PSYCHIATRY & NEUROLOGY

## 2021-05-26 PROCEDURE — 73502 X-RAY EXAM HIP UNI 2-3 VIEWS: CPT

## 2021-05-26 PROCEDURE — 80053 COMPREHEN METABOLIC PANEL: CPT

## 2021-05-26 PROCEDURE — 74011250637 HC RX REV CODE- 250/637: Performed by: PSYCHIATRY & NEUROLOGY

## 2021-05-26 PROCEDURE — 76450000000

## 2021-05-26 PROCEDURE — 36415 COLL VENOUS BLD VENIPUNCTURE: CPT

## 2021-05-26 PROCEDURE — 85025 COMPLETE CBC W/AUTO DIFF WBC: CPT

## 2021-05-26 PROCEDURE — 99232 SBSQ HOSP IP/OBS MODERATE 35: CPT | Performed by: INTERNAL MEDICINE

## 2021-05-26 PROCEDURE — 97535 SELF CARE MNGMENT TRAINING: CPT

## 2021-05-26 PROCEDURE — 97162 PT EVAL MOD COMPLEX 30 MIN: CPT

## 2021-05-26 PROCEDURE — 82962 GLUCOSE BLOOD TEST: CPT

## 2021-05-26 PROCEDURE — 97165 OT EVAL LOW COMPLEX 30 MIN: CPT

## 2021-05-26 PROCEDURE — 65660000004 HC RM CVT STEPDOWN

## 2021-05-26 PROCEDURE — 97116 GAIT TRAINING THERAPY: CPT

## 2021-05-26 PROCEDURE — 74011250636 HC RX REV CODE- 250/636: Performed by: NURSE PRACTITIONER

## 2021-05-26 PROCEDURE — 74011250637 HC RX REV CODE- 250/637: Performed by: INTERNAL MEDICINE

## 2021-05-26 PROCEDURE — 74011250637 HC RX REV CODE- 250/637: Performed by: NURSE PRACTITIONER

## 2021-05-26 RX ORDER — TRAMADOL HYDROCHLORIDE 50 MG/1
50 TABLET ORAL EVERY 12 HOURS
Status: DISCONTINUED | OUTPATIENT
Start: 2021-05-26 | End: 2021-05-27

## 2021-05-26 RX ORDER — PANTOPRAZOLE SODIUM 40 MG/1
40 TABLET, DELAYED RELEASE ORAL 2 TIMES DAILY
Status: DISCONTINUED | OUTPATIENT
Start: 2021-05-26 | End: 2021-05-27 | Stop reason: HOSPADM

## 2021-05-26 RX ORDER — FENTANYL 12.5 UG/1
1 PATCH TRANSDERMAL
Status: DISCONTINUED | OUTPATIENT
Start: 2021-05-26 | End: 2021-05-27 | Stop reason: HOSPADM

## 2021-05-26 RX ORDER — DULOXETIN HYDROCHLORIDE 20 MG/1
20 CAPSULE, DELAYED RELEASE ORAL DAILY
Status: DISCONTINUED | OUTPATIENT
Start: 2021-05-26 | End: 2021-05-27

## 2021-05-26 RX ADMIN — Medication 10 ML: at 23:11

## 2021-05-26 RX ADMIN — ENOXAPARIN SODIUM 40 MG: 40 INJECTION SUBCUTANEOUS at 08:56

## 2021-05-26 RX ADMIN — PANTOPRAZOLE SODIUM 40 MG: 40 TABLET, DELAYED RELEASE ORAL at 17:08

## 2021-05-26 RX ADMIN — ACETAMINOPHEN 650 MG: 325 TABLET ORAL at 17:08

## 2021-05-26 RX ADMIN — TRAMADOL HYDROCHLORIDE 50 MG: 50 TABLET, COATED ORAL at 09:57

## 2021-05-26 RX ADMIN — Medication 10 ML: at 09:22

## 2021-05-26 RX ADMIN — HYDRALAZINE HYDROCHLORIDE 10 MG: 20 INJECTION, SOLUTION INTRAMUSCULAR; INTRAVENOUS at 23:11

## 2021-05-26 RX ADMIN — Medication 10 ML: at 13:01

## 2021-05-26 RX ADMIN — TRAMADOL HYDROCHLORIDE 50 MG: 50 TABLET, COATED ORAL at 22:38

## 2021-05-26 RX ADMIN — ACETAMINOPHEN 650 MG: 325 TABLET ORAL at 09:02

## 2021-05-26 RX ADMIN — PANTOPRAZOLE SODIUM 40 MG: 40 TABLET, DELAYED RELEASE ORAL at 09:57

## 2021-05-26 RX ADMIN — DULOXETINE HYDROCHLORIDE 20 MG: 20 CAPSULE, DELAYED RELEASE ORAL at 14:30

## 2021-05-26 NOTE — PROGRESS NOTES
Psychiatric Consult    I have been asked by the patient's attending to evaluate her psychiatrically. Specifically questions have been raised about the patient's difficulties with depression, and her being admitted after taking a drug overdose. So for that purpose, the patient was evaluated, her chart was reviewed, and the case was discussed with  care attending hospitalist.  Please be referred to the dictated consultation note which is self-explanatory. 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 Axis II: Noncontributory  Axis III: Status post drug overdose. Chronic arthritis both knees and hips with secondary chronic pain. Status post left mastectomy, remote. Status post cataract surgery with lens replacement remote. History of allergies to sulfa drugs. Recommendations  First of all let me thank you for the kind referral.  1.  The patient was admitted after she initiated a phone call to her daughter-in-law to let her know that she had taking an overdose with her medications, and that she wanted to be death. 2.  Steps were then taken by the family to summoned emergency services with the patient being brought to our emergency room, attention invited to the work-up perform upon the patient's arrival to the emergency room, as it is self-explanatory. 3.  Admitted on suicidal precautions with a sitter at all times her attending requested a psychiatric consultation for the purpose to determine management of the patient's depression, and to determine the degree of potential for self-harm. 4.  On examination today, the patient remains suicidal.  She related her being self harmful to the pain that she experiences this resulting from the severe arthritic process described above. 5.  The patient is determined to be discharged.   She is agreeable however to start treatment with duloxetine, a low dose of 20 mg daily initially, hoping to help not only with her the pain but also with her depression. 6.  Upon my discussion with , he indicated the possibility of providing a prescription for pain medications, specifically in the form of a transdermal patch, hoping that this will help with the patient's pain and secondarily her suicidality. 7.  For a complete description of the patient's mental status please be referred to the dictated psychiatric consultation report. However of importance is that the patient remains a high suicidal risk at present. 8. So I will suggest continuation of current suicidal precautions with a sitter at all times, and if medically stable demanding to be discharged, and not able to be kept in the hospital through a medical TDO, a psychiatric TDO with referral as a result for involuntary admission to the behavioral unit is strongly suggested. I have mentioned to our crisis worker that she may receive a call from the medical floor requesting help to proceed with a psychiatric TDO evaluation. So please call extension [phone] if help is required. 9.  If the patient is not able to be helped with pain control, we remain very concerned that not only she will remain a high suicidal risk, but in addition that she will attempt again to kill herself. We will be glad to follow the patient psychiatrically if she stays in the medical floor, however consideration needs to be given whenever the patient is discharged from inpatient care, for her to be able to be referred to pain management.     Thank you again for the consultation,    Nakita Burger MD, [facility]

## 2021-05-26 NOTE — PROGRESS NOTES
[facility]  Progress Note    Patient: Mesfin Vidales Age: 80 y.o. : 10/23/1931 MR#: [id number] SSN: [id number]  Date/Time: 2021     C/C: Suicidal ideation      Subjective:   HPI : 63-year-old female past history of CA breast which was in 76s s/p mastectomy recently been suffering from arthritis and arthritis related pain called her granddaughter stating that she wants to kill herself and what appears to be to bunch of pills she was brought to the hospital by EMS. Currently she is angry that she was brought to the emergency room otherwise no suicidal ideation. Patient seen by psychiatrist who is suggesting continue current treatment today and if needed transfer to psych unit possibly tomorrow    1 severe DJD and associated pain multiple areas  2 history of breast CA  3 thin built    -Discussed with psych will continue current management  -Psych will reevaluate patient tomorrow  -Continue one-to-one          Review of Systems:  positive responses in bold type       Assessment/Plan:     1. Acute metabolic encephalopathy  Suicide attempt  Hyponatremia  Chronic anemia   Chronic lower extremity pain  Arthritis/ Arthritis related Joint pains   Hx Breast CA s/p left mastectomy    PLAN   - D/W Psych   - Add Duragesic pain patch   - see above for further discussion     Time spent on direct patient care >30 mints     Complexity : High complex - due to multiple medical issues outlined above.      CODE Status :     Case discussed with:  [x]Patient  [] Family  []Nursing  []Case Management   DVT Prophylaxis:  [x]Lovenox  []Hep SQ  []SCDs  []Coumadin   []On Heparin gtt      Objective:   VS:   Visit Vitals  BP (!) 120/56 (BP 1 Location: Right upper arm, BP Patient Position: At rest)   Pulse (!) 122   Temp 98 °F (36.7 °C)   Resp 17   Ht 5' 4\" (1.626 m)   Wt 49.9 kg (110 lb)   SpO2 100%   BMI 18.88 kg/m²      Tmax/24hrs: Temp (24hrs), Av.2 °F (36.8 °C), Min:97.4 °F (36.3 °C), Max:98.9 °F (37.2 °C)  IOBRIEF    Intake/Output Summary (Last 24 hours) at 5/26/2021 1510  Last data filed at 5/26/2021 1221  Gross per 24 hour   Intake 1000 ml   Output --   Net 1000 ml       General:  Alert, cooperative, no acute distress   HEENT: No facial asymmetry, FR[place]Y [place], External ears - WNL    Cardiovascular: S1S2 - regular , No Murmur   Pulmonary: Equal expansion , No Use of accessory muscles , No Rales No Rhonchi    GI:  +BS in all four quadrants, soft, non-tender  Extremities:  No edema; 2+ dorsalis pedis pulses bilaterally  Neuro: Alert and oriented X 2.        Medications:   Current Facility-Administered Medications   Medication Dose Route Frequency    pantoprazole (PROTONIX) tablet 40 mg  40 mg Oral BID    traMADoL (ULTRAM) tablet 50 mg  50 mg Oral Q12H    fentaNYL (DURAGESIC) 12 mcg/hr patch 1 Patch  1 Patch TransDERmal Q72H    DULoxetine (CYMBALTA) capsule 20 mg  20 mg Oral DAILY    sodium chloride (NS) flush 5-40 mL  5-40 mL IntraVENous Q8H    sodium chloride (NS) flush 5-40 mL  5-40 mL IntraVENous PRN    acetaminophen (TYLENOL) tablet 650 mg  650 mg Oral Q6H PRN    Or    acetaminophen (TYLENOL) suppository 650 mg  650 mg Rectal Q6H PRN    polyethylene glycol (MIRALAX) packet 17 g  17 g Oral DAILY PRN    promethazine (PHENERGAN) tablet 12.5 mg  12.5 mg Oral Q6H PRN    Or    ondansetron (ZOFRAN) injection 4 mg  4 mg IntraVENous Q6H PRN    enoxaparin (LOVENOX) injection 40 mg  40 mg SubCUTAneous DAILY    hydrALAZINE (APRESOLINE) 20 mg/mL injection 10 mg  10 mg IntraVENous Q6H PRN       Labs:    Recent Labs     05/26/21  0521 05/25/21  1445   WBC 11.1 6.9   HGB 11.9* 10.4*   HCT 36.9 31.2*    273     Recent Labs     05/26/21  0521 05/25/21  1445   * 129*   K 4.1 4.4    103   CO2 22 21   GLU 77 87   BUN 10 15   CREA 0.70 0.74   CA 8.1* 7.8*   MG  --  2.2   PHOS  --  3.6   ALB 3.0* 2.9*   ALT 15 15   INR  --  1.0         Disclaimer: Sections of this note are dictated utilizing voice recognition software, which may have resulted in some phonetic based errors in grammar and contents. Even though attempts were made to correct all the mistakes, some may have been missed, and remained in the body of the document. If questions arise, please contact our department.     Signed By: Helga Reina MD     May 26, 2021

## 2021-05-26 NOTE — PROGRESS NOTES
conducted an initial consultation and Spiritual Assessment for Bethel Wick, who is a 80 y.o.,female. Patients Primary Language is: [place]. According to the patients EMR Amish Affiliation is: Buddhism.     The reason the Patient came to the hospital is:   Patient Active Problem List    Diagnosis Date Noted    Severe protein-calorie malnutrition ([facility] [address] 05/26/2021    Suicide attempt ([facility] [address] 05/25/2021    Acute metabolic encephalopathy 93/43/7254    Complex renal cyst 12/18/2017        The  provided the following Interventions:  Initiated a relationship of care and support. A sitter was by the bedside of the patient. Patient was staring blankly at the wall when I came in but responded to my greeting. Explored issues of jaelyn, belief, spirituality and Mormonism/ritual needs while hospitalized. Listened empathically. Patient shared that she lives alone in a three acre home for 8 years now when her  for 62 years passed away. Her son lives about 15 miles away from her. She has two granddaughters of different mothers the reason she doesn't see them as much. Patient complained that she is in so much pain on her legs that Tylenol doesn't help anymore. Provided chaplaincy education. Provided information about Spiritual Care Services. Offered assurance of continued prayers on patient's behalf. Chart reviewed. The following outcomes where achieved:  Patient shared limited information about both her medical narrative and spiritual journey/beliefs.  not able to confirm Patient's Amish Affiliation. Patient processed feeling about current hospitalization. Patient expressed  that she's old and in so much pain that it's not worth living any longer just when her food was brought in and her attention was diverted and my visit was cut short. Patient expressed gratitude for 's visit.     Assessment:  Patient does not have any Mormonism/cultural needs that will affect patients preferences in health care. There are no spiritual or Latter day issues which require intervention at this time. Plan:  Chaplains will continue to follow and will provide pastoral care on an as needed/requested basis.  recommends bedside caregivers page  on duty if patient shows signs of acute spiritual or emotional distress.     [address] [facility]   [phone]

## 2021-05-26 NOTE — PROGRESS NOTES
Bedside, Verbal, and Written shift change report given to Maryann Gutierrez RN (oncoming nurse) by Matt Das RN (offgoing nurse). Report included the following information SBAR, Kardex, Intake/Output, MAR, Recent Results, and Cardiac Rhythm NSR.     1130 pt transported to xray    1210 pt returned from xray     1230 pt given finger foods for lunch, son and daughter in-law bedside    Bedside, Verbal, and Written shift change report given to Rodolfo Barriga RN (oncoming nurse) by Maryann Gutierrez RN (offgoing nurse). Report included the following information SBAR, Kardex, Intake/Output, MAR, Recent Results, and Cardiac Rhythm NSR.

## 2021-05-26 NOTE — ED NOTES
[facility] resting in bed with eyes closed. No acute distress noted. Symmetrical rise and fall of chest. Call light in reach.

## 2021-05-26 NOTE — ED NOTES
Pt is now awake, alert, and speaking. Pt is disoriented and yelling for help. Pt asked where she was.

## 2021-05-26 NOTE — PROGRESS NOTES
Comprehensive Nutrition Assessment    Type and Reason for Visit: Initial, Positive nutrition screen    Nutrition Recommendations/Plan:  - Continue regular diet, finger foods only. - Add supplements: Ensure Enlive, BID & Magic Cup once daily.  - Monitor and encourage po intake as tolerated. Nutrition Assessment:  Pt sitting up in bed during visit eating lunch, sitter at bedside. Finger foods only, no utensils. Reports good appetite and agreeable to try supplements. Denies diet intolerance. NFPE completed. Psych following    Malnutrition Assessment:  Malnutrition Status:  Severe malnutrition    Context:  Chronic illness     Findings of the 6 clinical characteristics of malnutrition:   Energy Intake:  No significant decrease in energy intake  Weight Loss:  Unable to assess     Body Fat Loss:  7 - Severe body fat loss, Fat overlying ribs, Orbital   Muscle Mass Loss:  7 - Severe muscle mass loss, Temples (temporalis), Clavicles (pectoralis &deltoids), Hand (interosseous)  Fluid Accumulation:  Unable to assess,     Strength:  Not performed     Nutrition History and Allergies: PMHx- breast cancer s/p left mastectomy, chronic LE pain and arthritis. Presented to ED for drug overdose. Reports good po intake PTA, 3 meals small meals/day with snack. Denies changes in wt with UBW of 103-106#. NKFA. Estimated Daily Nutrient Needs:  Energy (kcal): 9414-0647; Weight Used for Energy Requirements: Current (50 kg)  Protein (g): 60-75; Weight Used for Protein Requirements: Current (1.2-1.5)  Fluid (ml/day): 0494-6287; Method Used for Fluid Requirements: 1 ml/kcal    Nutrition Related Findings:  BM PTA. Miralax ordered prn, pt states she takes a laxative every 3 days and last took it 5/25/21.       Wounds:    None       Current Nutrition Therapies:  DIET REGULAR  DIET NUTRITIONAL SUPPLEMENTS Breakfast, Dinner; Ensure [employer]  DIET NUTRITIONAL SUPPLEMENTS Lunch; Magic Cups    Anthropometric Measures:  · Height:  5' 4\" (162.6 cm)  · Current Body Wt:  49.9 kg (110 lb 0.2 oz)   · Admission Body Wt:  110 lb 0.2 oz    · Usual Body Wt:  48.1 kg (106 lb) (pt report)     · Ideal Body Wt:  120 lbs:  91.7 %   · BMI Category:  Underweight (BMI less than 22) age over 72       Nutrition Diagnosis:   · Severe malnutrition, In context of chronic illness related to early satiety, psychological cause or life stress (advanced age) as evidenced by severe muscle loss, severe loss of subcutaneous fat    Nutrition Interventions:   Food and/or Nutrient Delivery: Continue current diet, Start oral nutrition supplement  Nutrition Education and Counseling: Education not indicated  Coordination of Nutrition Care: Continue to monitor while inpatient (pt discussed with RN)    Goals:  PO nutrition intake will meet >75% of patient estimated nutritional needs within the next 7 days.        Nutrition Monitoring and Evaluation:   Behavioral-Environmental Outcomes: None identified  Food/Nutrient Intake Outcomes: Diet advancement/tolerance, Food and nutrient intake, Supplement intake  Physical Signs/Symptoms Outcomes: Biochemical data, GI status, Meal time behavior, Nutrition focused physical findings    Discharge Planning:    Continue oral nutrition supplement, Continue current diet     Electronically signed by Benny Ascencio RD on 5/26/2021 at 3:32 PM    Contact: [phone]

## 2021-05-26 NOTE — PROGRESS NOTES
PT orders received and chart reviewed. Active bedrest orders. Pending right hip x-ray d/t patient complaints of right hip pain. Will follow up post imaging. Please update activity level as appropriate to maximize patient safety and participation in functional mobility.

## 2021-05-26 NOTE — PROGRESS NOTES
OCCUPATIONAL THERAPY EVALUATION/DISCHARGE    Patient: Chaparro Diehl (7 y.o. female)  Date: 5/26/2021  Primary Diagnosis: Acute metabolic encephalopathy [A36.36]  Suicide attempt [facility]) [T14.91XA]       Precautions:   Fall, Skin, Aspiration (suicide)  PLOF: Pt was independent with self-care tasks prior to hospital admission. Pt utilizes cane to ambulate in house and community. Pt was cooking and cleaning and doing yard work prior to hospitalization. ASSESSMENT AND RECOMMENDATIONS:  Pt cleared by RN for OT eval. Co-treat with PT to maximize pt safety and functional mobility. Sitting present and family present on entering. Agreeable to OT eval.    Based on the objective data described below, the patient presents at overall baseline for self-care skills. Pt demonstrated functional ROM and strength in UE to complete self-care tasks with mod I. Pt able to don/doff socks and abraham slip-on shoes with mod I (attempted in standing, but sat d/t shoes scooting away while attempt to stand). HR in 120s with seated EOB. Pt able to stand and demonstrate functional transfers with SUPV for safety with use of cane. Kyphotic posture noted to be baseline. Provided education on SPT to [facility] using RW in room with nursing present. Pt agreeable and demonstrated understanding. All needs met. Sitter and family at bedside. No further OT required at this time. Skilled occupational therapy is not indicated at this time. Discharge Recommendations: Home with assistance as needed (see psyc eval regarding any further placement)  Further Equipment Recommendations for Discharge: shower chair (discussed with pt and family in room.)     SUBJECTIVE:   Patient stated  I was walking around my yard before I came in here.     OBJECTIVE DATA SUMMARY:     Past Medical History:   Diagnosis Date    Arthritis     Asthma     Cancer ([facility] [address]     hx of breast cancer    Disorder     scoliosis    Other ill-defined conditions(799.89)     osteoporosis, Past Surgical History:   Procedure Laterality Date    BREAST SURGERY PROCEDURE UNLISTED      mastectomy - left    HX HEENT      cataract surgery with lens replacement     Barriers to Learning/Limitations: None  Compensate with: visual, verbal, tactile, kinesthetic cues/model    Home Situation:   Home Situation  Home Environment: Private residence  # Steps to Enter: 6  Rails to Enter: Yes  One/Two Story Residence: One story  Living Alone: Yes  Support Systems: Family member(s)  Patient Expects to be Discharged to[de-identified] Private residence  Current DME Used/Available at Home: Cane, straight  Tub or Shower Type: Tub/Shower combination  [x]     Right hand dominant   []     Left hand dominant    Cognitive/Behavioral Status:  Neurologic State: Alert  Orientation Level: Oriented X4  Cognition: Follows commands       Skin: visible intact with min bruising on L dorsal hand and R elbow from IV ports  Edema: none      Coordination: BUE  Coordination: Within functional limits  Fine Motor Skills-Upper: Left Intact; Right Intact    Gross Motor Skills-Upper: Left Intact; Right Intact    Balance:  Sitting: Intact  Standing: Impaired  Standing - Static: Good  Standing - Dynamic : Fair    Strength: BUE  Strength:  Within functional limits       Tone & Sensation: BUE  Tone: Normal  Sensation: Intact       Range of Motion: BUE  AROM: Within functional limits       Functional Mobility and Transfers for ADLs:  Bed Mobility:  Supine to Sit: Supervision  Sit to Supine: Supervision     Transfers:  Sit to Stand: Supervision  Stand to Sit: Supervision    Bathroom Mobility: Supervision/set up    ADL Assessment:  Feeding: Modified independent    Oral Facial Hygiene/Grooming: Modified Independent    Bathing: Modified independent    Upper Body Dressing: Modified independent    Lower Body Dressing: Modified independent    Toileting: Modified independent        ADL Intervention:     Upper Body Dressing Assistance  Dressing Assistance: Modified independent  Shirt simulation with hospital gown: Modified independent    Lower Body Dressing Assistance  Dressing Assistance: Modified independent  Socks: Modified independent  Slip on Shoes Without Back: Modified independent  Leg Crossed Method Used: No  Position Performed: Seated edge of bed (standing attempt to don slippers; then sit for safety)      Therapeutic Exercise:  Provided education on grasp/release and heel pumps for circulation d/t request from family     Pain:  Pain level pre-treatment: 0/10 at rest; 9/10 with movement   Pain level post-treatment: 0/10   Pain Intervention(s): Medication (see MAR); Rest, Ice, Repositioning   Response to intervention: Nurse notified, See doc flow    Activity Tolerance:   Good    Please refer to the flowsheet for vital signs taken during this treatment. After treatment:   []  Patient left in no apparent distress sitting up in chair  [x]  Patient left in no apparent distress in bed  [x]  Call bell left within reach  [x]  Nursing notified  [x]  Caregiver and family present  []  Bed alarm activated    COMMUNICATION/EDUCATION:   [x]      Role of Occupational Therapy in the acute care setting  [x]      Home safety education was provided and the patient/caregiver indicated understanding. [x]      Patient/family have participated as able and agree with findings and recommendations. []      Patient is unable to participate in plan of care at this time. Thank you for this referral.  CLAIRE Han, OTR/L  Time Calculation: 26 mins      Eval Complexity: History: LOW Complexity : Brief history review ; Examination: LOW Complexity : 1-3 performance deficits relating to physical, cognitive , or psychosocial skils that result in activity limitations and / or participation restrictions ;    Decision Making:LOW Complexity : No comorbidities that affect functional and no verbal or physical assistance needed to complete eval tasks

## 2021-05-26 NOTE — PROGRESS NOTES
OT orders received and chart reviewed. Current active bedrest orders at this time. Pt also pending right hip x-ray d/t pt complaints of R hip pain. Please update activity level as appropriate to maximize pt safety and participation.     Thank you for this referral.    Raz Alicia, OTSANJUANA, OTR/L

## 2021-05-26 NOTE — PROGRESS NOTES
Reason for Admission:  Acute metabolic encephalopathy [W55.71]  Suicide attempt ([address] [T14.91XA]                 RUR Score:   14%           Plan for utilizing home health: To be determined                    Likelihood of Readmission:   LOW                         Transition of Care Plan:              Initial assessment completed with patient. Cognitive status of patient: oriented to time, place, person and situation. Face sheet information confirmed:  yes. The patient designates Swetha Rodrigues, son ([phone])UB participate in her discharge plan and to receive any needed information. This patient lives alone  in a single family home with cats. Patient is able to navigate steps as needed. Prior to hospitalization, patient was considered to be independent with ADLs/IADLS : yes . Patient has a current ACP document on file: no      Healthcare Decision Maker:  no      The patient's son will be available to transport patient home upon discharge. The patient already has [employer] equipment available in the home. Patient is not currently active with home health. Patient has not stayed in a skilled nursing facility or rehab. This patient is on dialysis :no      Freedom of choice signed: no.     Currently, the discharge plan is home w/ home health vs psych unit    Patient is on suicide precautions with a sitter present. Also, palliative care was consulted. CM will continue to [facility]. The patient states that she can obtain her medications from the pharmacy, and take her medications as directed. Patient's current insurance is Medicare Part A & B and [facility]      Care Management Interventions  PCP Verified by CM:  Yes  Mode of Transport at Discharge: Self  Transition of Care Consult (CM Consult): Discharge Planning  Discharge Durable Medical Equipment: No  Physical Therapy Consult: Yes  Occupational Therapy Consult: Yes  Speech Therapy Consult: No  Current Support Network: Lives Alone, Own Home  Confirm Follow Up Transport: Self  Discharge Location  Discharge Placement:  (home w/ home health  vs. Psych. unit)       LOY LaraN, RN  Pager # [phone]  Care Manager

## 2021-05-26 NOTE — ED NOTES
TRANSFER - OUT REPORT:    Verbal report given to Kathrin(name) on Brian Teran  being transferred to CVT Stepdown(unit) for routine progression of care       Report consisted of patients Situation, Background, Assessment and   Recommendations(SBAR). Information from the following report(s) SBAR, ED Summary and MAR was reviewed with the receiving nurse. Lines:   Peripheral IV 05/25/21 Right Hand (Active)   Site Assessment Clean, dry, & intact 05/25/21 1450   Phlebitis Assessment 0 05/25/21 1450   Infiltration Assessment 0 05/25/21 1450   Dressing Status Clean, dry, & intact 05/25/21 1450   Hub Color/Line Status Pink 05/25/21 1450       Peripheral IV 05/25/21 Right Other(comment) (Active)   Site Assessment Clean, dry, & intact 05/25/21 1452   Phlebitis Assessment 0 05/25/21 1452   Infiltration Assessment 0 05/25/21 1452   Dressing Status Clean, dry, & intact 05/25/21 1452   Hub Color/Line Status Pink 05/25/21 1452        Opportunity for questions and clarification was provided.       Patient transported with:   Registered Nurse

## 2021-05-26 NOTE — CONSULTS
[facility]    Name:  Fely Medicine  MR#:   [id number]  :  10/23/1931  ACCOUNT #:  [de-identified]  DATE OF SERVICE:  2021    IDENTIFYING INFORMATION:  The patient is an 26-year-old female, admitted to this facility on the above-mentioned date. BASIS FOR ADMISSION AND REASON FOR PSYCHIATRIC EVALUATION:  Attention is invited to the emergency room examination performed when the patient arrived in our emergency department via EMS after her family had summoned the EMS team to the patient's residence. It appears by the patient's own description, this being also confirmed with what the chart said, that the patient had called her daughter-in-law stating that she was going to die and would not make it. It appears that she also mentioned to her that she had taken an overdose. When EMS arrived on the scene, they reported that she was sitting in a chair unresponsive with all of her medications lying out on the table next to her and a note stating that she can no longer take the pain and that was the reason for which she had taking an overdose to kill herself. The patient also made a list of what she wanted to give her family in regard to a cat that stays with her, with the patient being given emergency services by EMS while at her home with 1 mg of Narcan intranasal and 1 mg IV of Narcan en route with minimal improvement in her responsiveness. The patient when arrived at the emergency room, she was not breathing with a GCS of 7. After the patient was stabilized, she was admitted to the step-down cardiac unit with Dr. Satish Avila, the patient's attending, kindly requesting the undersigned to evaluate her psychiatrically. PSYCHIATRIC HISTORY:  It appears that the patient has never had any type of psychiatric treatment before.   She does describe very clearly a chronic history of difficulties managing the pain that she has, initially on both of her knees, later on both of her hips, to the point in which it is this severe arthritic pain the reason for which she has felt increasingly depressed and the reason for which she took the above-mentioned multidrug overdose. It is of concern for obvious reasons the fact that the patient is 80years of age and she described to the undersigned, in regard to the pain that she has, that the treatment with acetaminophen given to her is not working and that low dose of tramadol, that initially was started by Dr. Xenia Fajardo years ago before his group home, has continued to be prescribed to the patient. She mentioned that tramadol is doing very little for her; however, she is concerned about currently stopping because of the usual withdrawals that she could experience. Nonetheless, she is also very thankful to the fact that she was given something that initially would help the severity of the pain that she describes. The patient has been thinking about harming herself, she says, for longer than 6 months. She needed to make some payments on her bills, she said; however, she noted on the day in which she took the overdose that everything was being paid, the yard looked correct and well taken care of and so she said she was ready and immediately proceeded to take the overdose. It was after she had taken the overdose that she let her daughter-in-law know and to some degree, she said, she is upset about the fact that \"these people apparently came to my house and brought me here. \"  She was obviously disappointed knowing that she was not allowed to die. MEDICAL HISTORY:  This is remarkable for history of cancer of the left breast with status post mastectomy, history of scoliosis and arthritis of both knees and apparently both hips. Surgical history is also remarkable in addition to the above-mentioned mastectomy of her having cataract surgery with lens replacement. ALLERGIES:  THE PATIENT IS DESCRIBED TO BE ALLERGIC TO SULFA DRUGS.     REVIEW OF SYSTEMS: Please be referred to the H and P performed by the nurse practitioner with the hospitalist group, this being self-explanatory. MEDICATIONS:  Being prescribed to the patient prior to admission included prescriptions for esomeprazole (Nexium), Lidoderm 5% patch, ferrous sulfate for the treatment of her anemia, tramadol 50 mg twice a day for pain. She is taking Prolia for the treatment of her osteoporosis, she says, vitamin D3 also for same. She is taking calcium 1500 mg once a day and also she has a prescription for alprazolam, not clear if she is taking it now, 0.5 mg once a day. LABORATORY DATA:  Multiple labs were performed at the time of the patient's evaluation in the emergency department, again all of them described in the H and P notes. The presence of anemia noted with a CBC. The CMP showed a low sodium of 129, with normal kidney functioning tests and normal blood sugar of 87. Normal liver function tests also noted. Coagulation studies are normal.  Acetaminophen levels 12 mcg/mL. Salicylate levels below 1.7 mg/dL. Normal TSH of 0.62 noted. In addition, an electrocardiogram was performed showing no evidence of acute findings with normal QT and QTc elongations. A CT of the head showed no evidence of an acute intracranial process, chronic small vessel ischemic changes noted on the CT; however, it is to be mentioned that the patient showed, upon examination today, no evidence of memory impairment and no evidence specifically of a dementing process. ALCOHOL AND DRUG HISTORY:  Negative. PERSONAL HISTORY AND FAMILY HISTORY:  The patient lives by herself. She has a cat that stays with her that was to be taken care of. She mentioned having a son and a daughter. Her son and daughter-in-law live very close by, the same as a grandson who is also  and has, I believe, two children.   The patient appears to be close to her family and again she called them after taking the above-mentioned overdose to let them know that she was going to be dead. The intent which she said is not for her to be safe, but basically for them not to be surprised if she was found not alive. MENTAL STATUS EXAM:  This is a female who looks her stated age. There is no evidence of alcohol or any other type of drug-related signs of intoxication or withdrawal symptoms. The patient is coherent, shows quality of continuity of associations without evidence of flight of ideas, pressure of speech, ideas of reference or influence or any hallucinatory process. Of interest is that she denies being depressed; however, she also mentions that she has been rather overwhelmed by the pain that she has and that she was obviously suicidal when she took the overdose. Her intent was very clear, she said, that she wanted to die and again repeated herself when she said \"I do not know why they saved me. \"  During the evaluation, there is no evidence of cognitive impairment. Memory appears to be rather appropriate for an 80-year-old woman whose immediate recall, recent memories, and remote memories are normal.  Insight and judgment are not the best; however, again remains able to know what she is doing, she knows the difference between right and wrong, and she knows what the consequences are for her actions. In my opinion, the patient has capacity. 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 AXIS II:  Noncontributory. AXIS III:  Status post drug overdose. Chronic arthritis, both knees and hips, with secondary chronic pain. Status post left mastectomy, remote. Status post cataract surgery with lens replacement, remote. Anemia, iron deficient. Hyponatremia upon admission to the emergency room. History of allergy to sulfa drugs.     RECOMMENDATIONS:  First of all, I want to thank Dr. Ishaan Garcia for requesting us to evaluate the patient psychiatrically. 1.  For a detailed information of the patient's history, please see above. 2.  The patient remains a high suicidal risk. She is determined that she is going to be discharged from the hospital today and again she continues to be determined that if her pain does not get better, she will kill herself. 3.  So, the question is the need for the patient to (I) remain in the facility from a medical point of view and if not (II) the possibility of her requiring to be admitted to the behavioral unit to continue further inpatient psychiatric treatment. 4.  I agree with Dr. Ishaan Garcia that if the patient's pain is improved, her suicidality will be exponentially improved. 5.  Dr. Ishaan Garcia mentioned providing the patient with a prescription of a patch, something that obviously we are in agreement of, specifically because of concerns about giving her a prescription of medications that she can take as an overdose. 6.  I had informed our crisis worker of the possibility of the patient not being able to be maintained in the medical floor through a medical TDO and so if you have any questions about how to get a TDO evaluation for psychiatric purposes, please call extension [phone] and we will be very glad to help you. 7.  We will be very glad to follow the patient while she is here at [facility] and again if she is transferred to the psychiatric floor unit, we will provide for her inpatient psychiatric care there.       Mi Courtney MD, LFAPA      [id number]  D:  05/26/2021 12:03  T:  05/26/2021 15:53  JOB #:  [id number]

## 2021-05-26 NOTE — PROGRESS NOTES
2200- Patient received to unit. Patient switched from stretcher to patient bed. Patient keeps saying\"take the pain away. \" when asking patient where her pain was she started to rub her right hip. When asking where her pain was she stated her hip. I asked patient why she took so much medication Patient placed on the monitor. 3600 [facility] called to inquire about getting a xray of patient's right hip.

## 2021-05-26 NOTE — PROGRESS NOTES
Problem: Mobility Impaired (Adult and Pediatric)  Goal: *Acute Goals and Plan of Care (Insert Text)  Description: Physical Therapy Goals  Initiated 5/26/2021 and to be accomplished within 7 day(s)  1. Patient will move from supine to sit and sit to supine in bed with modified independence. 2.  Patient will transfer from bed to chair and chair to bed with modified independence using the least restrictive device. 3.  Patient will perform sit to stand with modified independence. 4.  Patient will ambulate with modified independence for 50 feet with the least restrictive device. 5.  Patient will ascend/descend 6 stairs with handrail(s) with modified independence. PLOF: Mod I with cane    Outcome: Progressing Towards Goal   PHYSICAL THERAPY EVALUATION    Patient: Queen Helene (72 y.o. female)  Date: 5/26/2021  Primary Diagnosis: Acute metabolic encephalopathy [O10.80]  Suicide attempt [facility]) [T14.91XA]  Precautions: Fall, Skin, Aspiration (suicide)  ASSESSMENT :  Co-treated with OT to maximize patient safety and participation in functional mobility. Sitter present at start/end of session. Family at bedside. Supervision for sit to supine. HR 120s with seated EOB. Supervision for sit to stand. Kyphotic posture. Amb with cane 25ft with supervision. Amb limited by right hip pain 9/10. RN Emir David notified. Returned to seated in bed with supervision. HR decreased to 110s post amb. Educated on use of bedside commode for toileting needs with staff supervision. Educated on need for RN assistance with mobility; verbalized understanding. Patient will benefit from skilled intervention to address the above impairments.   Patient's rehabilitation potential is considered to be Good  Factors which may influence rehabilitation potential include:   []         None noted  []         Mental ability/status  [x]         Medical condition  []         Home/family situation and support systems  []         Safety awareness  [] Pain tolerance/management  []         Other:      PLAN :  Recommendations and Planned Interventions:   [x]           Bed Mobility Training             [x]    Neuromuscular Re-Education  [x]           Transfer Training                   []    Orthotic/Prosthetic Training  [x]           Gait Training                          []    Modalities  [x]           Therapeutic Exercises           []    Edema Management/Control  [x]           Therapeutic Activities            [x]    Family Training/Education  [x]           Patient Education  []           Other (comment):    Frequency/Duration: Patient will be followed by physical therapy 1-2 times per day/4-7 days per week to address goals. Discharge Recommendations: Home Health with supervision  Further Equipment Recommendations for Discharge: straight cane; has     SUBJECTIVE:   Patient stated Why do I have a sitter?     OBJECTIVE DATA SUMMARY:     Past Medical History:   Diagnosis Date    Arthritis     Asthma     Cancer ([facility] [address]     hx of breast cancer    Disorder     scoliosis    Other ill-defined conditions(799.89)     osteoporosis,      Past Surgical History:   Procedure Laterality Date    BREAST SURGERY PROCEDURE UNLISTED      mastectomy - left    HX HEENT      cataract surgery with lens replacement     Barriers to Learning/Limitations: None  Compensate with: Visual Cues, Verbal Cues, Tactile Cues and Kinesthetic Cues    Home Situation:  Home Situation  Home Environment: Private residence  # Steps to Enter: 6  Rails to Enter: Yes  One/Two Story Residence: One story  Living Alone: Yes  Support Systems: Family member(s)  Patient Expects to be Discharged to[de-identified] Private residence  Current DME Used/Available at Home: [address] [facility]    Critical Behavior:  Neurologic State: Alert  Orientation Level: Oriented X4  Cognition: Follows commands     Psychosocial  Patient Behaviors: Cooperative  Family  Behaviors: Supportive    Strength:    Manual Muscle Testing (LE)         R L    Hip Flexion:   4+/5  4+/5  Knee EXT:   4+/5  4+/5  Knee FLEX:   4+/5  4+/5  Ankle DF:   4+/5  4+/5  _________________________________________________   Range Of Motion:  BLE AROM WFL  Functional Mobility:  Bed Mobility:  Supine to Sit: Supervision  Sit to Supine: Supervision  Transfers:  Sit to Stand: Supervision  Stand to Sit: Supervision  Balance:   Sitting: Intact  Standing: Impaired  Standing - Static: Good  Standing - Dynamic : Fair  Ambulation/Gait Training:  Distance (ft): 25 Feet (ft)   Assistive Device: Cane, straight  Ambulation - Level of Assistance: Supervision  Neuro Re-education:  Seated balance 5 minutes  Standing balance 5 minutes  Therapeutic Exercises:   Sit to stand x5  Pain:  Pain level pre-treatment: 9/10   Pain level post-treatment: 9/10     Activity Tolerance:   Fair    After treatment:   []         Patient left in no apparent distress sitting up in chair  [x]         Patient left in no apparent distress in bed  [x]         Call bell left within reach  [x]         Nursing notified  [x]         Sitter present  []         Bed alarm activated  []         SCDs applied    COMMUNICATION/EDUCATION:   [x]         Role of physical therapy and plan of care in the acute care setting. [x]         Fall prevention education was provided and the patient/caregiver indicated understanding. [x]         Patient/family have participated as able in goal setting and plan of care. []         Patient/family agree to work toward stated goals and plan of care. []         Patient understands intent and goals of therapy, but is neutral about his/her participation. []         Patient is unable to participate in goal setting/plan of care: ongoing with therapy staff.     Thank you for this referral.  August Arizmendi, PT   Time Calculation: 32 mins    Eval Complexity: History: MEDIUM  Complexity : 1-2 comorbidities / personal factors will impact the outcome/ POC Exam:MEDIUM Complexity : 3 Standardized tests and measures addressing body structure, function, activity limitation and / or participation in recreation  Presentation: MEDIUM Complexity : Evolving with changing characteristics  Clinical Decision Making:Medium Complexity    Clinical judgement; ROM, MMT, functional mobility Overall Complexity:MEDIUM

## 2021-05-27 ENCOUNTER — HOSPITAL ENCOUNTER (INPATIENT)
Age: 86
LOS: 7 days | Discharge: HOME | DRG: 917 | End: 2021-06-03
Attending: PSYCHIATRY & NEUROLOGY | Admitting: PSYCHIATRY & NEUROLOGY
Payer: MEDICARE

## 2021-05-27 VITALS
RESPIRATION RATE: 17 BRPM | TEMPERATURE: 99 F | WEIGHT: 104.7 LBS | OXYGEN SATURATION: 94 % | HEART RATE: 112 BPM | SYSTOLIC BLOOD PRESSURE: 167 MMHG | BODY MASS INDEX: 17.87 KG/M2 | HEIGHT: 64 IN | DIASTOLIC BLOOD PRESSURE: 86 MMHG

## 2021-05-27 PROBLEM — F32.A DEPRESSION: Status: ACTIVE | Noted: 2021-05-27

## 2021-05-27 LAB
ALBUMIN SERPL-MCNC: 2.8 G/DL (ref 3.4–5)
ALBUMIN/GLOB SERPL: 0.7 {RATIO} (ref 0.8–1.7)
ALP SERPL-CCNC: 83 U/L (ref 45–117)
ALT SERPL-CCNC: 15 U/L (ref 13–56)
ANION GAP SERPL CALC-SCNC: 7 MMOL/L (ref 3–18)
AST SERPL-CCNC: 18 U/L (ref 10–38)
BASOPHILS # BLD: 0 K/UL (ref 0–0.1)
BASOPHILS NFR BLD: 0 % (ref 0–2)
BILIRUB SERPL-MCNC: 0.3 MG/DL (ref 0.2–1)
BUN SERPL-MCNC: 10 MG/DL (ref 7–18)
BUN/CREAT SERPL: 14 (ref 12–20)
CALCIUM SERPL-MCNC: 8.6 MG/DL (ref 8.5–10.1)
CHLORIDE SERPL-SCNC: 103 MMOL/L (ref 100–111)
CO2 SERPL-SCNC: 23 MMOL/L (ref 21–32)
CREAT SERPL-MCNC: 0.73 MG/DL (ref 0.6–1.3)
DIFFERENTIAL METHOD BLD: ABNORMAL
EOSINOPHIL # BLD: 0.2 K/UL (ref 0–0.4)
EOSINOPHIL NFR BLD: 2 % (ref 0–5)
ERYTHROCYTE [DISTWIDTH] IN BLOOD BY AUTOMATED COUNT: 17.8 % (ref 11.6–14.5)
GLOBULIN SER CALC-MCNC: 4 G/DL (ref 2–4)
GLUCOSE SERPL-MCNC: 115 MG/DL (ref 74–99)
HCT VFR BLD AUTO: 35.6 % (ref 35–45)
HGB BLD-MCNC: 11.6 G/DL (ref 12–16)
LYMPHOCYTES # BLD: 0.8 K/UL (ref 0.9–3.6)
LYMPHOCYTES NFR BLD: 8 % (ref 21–52)
MCH RBC QN AUTO: 27.7 PG (ref 24–34)
MCHC RBC AUTO-ENTMCNC: 32.6 G/DL (ref 31–37)
MCV RBC AUTO: 85 FL (ref 74–97)
MONOCYTES # BLD: 0.9 K/UL (ref 0.05–1.2)
MONOCYTES NFR BLD: 9 % (ref 3–10)
NEUTS SEG # BLD: 7.8 K/UL (ref 1.8–8)
NEUTS SEG NFR BLD: 80 % (ref 40–73)
PLATELET # BLD AUTO: 364 K/UL (ref 135–420)
PMV BLD AUTO: 8.9 FL (ref 9.2–11.8)
POTASSIUM SERPL-SCNC: 3.9 MMOL/L (ref 3.5–5.5)
PROT SERPL-MCNC: 6.8 G/DL (ref 6.4–8.2)
RBC # BLD AUTO: 4.19 M/UL (ref 4.2–5.3)
SODIUM SERPL-SCNC: 133 MMOL/L (ref 136–145)
WBC # BLD AUTO: 9.8 K/UL (ref 4.6–13.2)

## 2021-05-27 PROCEDURE — 80053 COMPREHEN METABOLIC PANEL: CPT

## 2021-05-27 PROCEDURE — 2709999900 HC NON-CHARGEABLE SUPPLY

## 2021-05-27 PROCEDURE — 99233 SBSQ HOSP IP/OBS HIGH 50: CPT | Performed by: INTERNAL MEDICINE

## 2021-05-27 PROCEDURE — 74011250637 HC RX REV CODE- 250/637: Performed by: PSYCHIATRY & NEUROLOGY

## 2021-05-27 PROCEDURE — 36415 COLL VENOUS BLD VENIPUNCTURE: CPT

## 2021-05-27 PROCEDURE — 85025 COMPLETE CBC W/AUTO DIFF WBC: CPT

## 2021-05-27 PROCEDURE — 74011250636 HC RX REV CODE- 250/636: Performed by: INTERNAL MEDICINE

## 2021-05-27 PROCEDURE — 74011250637 HC RX REV CODE- 250/637: Performed by: INTERNAL MEDICINE

## 2021-05-27 PROCEDURE — 99239 HOSP IP/OBS DSCHRG MGMT >30: CPT | Performed by: INTERNAL MEDICINE

## 2021-05-27 PROCEDURE — 74011250636 HC RX REV CODE- 250/636: Performed by: NURSE PRACTITIONER

## 2021-05-27 PROCEDURE — 74011000250 HC RX REV CODE- 250: Performed by: INTERNAL MEDICINE

## 2021-05-27 PROCEDURE — 74011250637 HC RX REV CODE- 250/637: Performed by: NURSE PRACTITIONER

## 2021-05-27 PROCEDURE — 65220000003 HC RM SEMIPRIVATE PSYCH

## 2021-05-27 RX ORDER — ACETAMINOPHEN 325 MG/1
650 TABLET ORAL
Status: DISCONTINUED | OUTPATIENT
Start: 2021-05-27 | End: 2021-06-03 | Stop reason: HOSPADM

## 2021-05-27 RX ORDER — CHOLECALCIFEROL (VITAMIN D3) 125 MCG
5 CAPSULE ORAL
Status: DISCONTINUED | OUTPATIENT
Start: 2021-05-27 | End: 2021-05-27 | Stop reason: HOSPADM

## 2021-05-27 RX ORDER — SODIUM CHLORIDE, SODIUM LACTATE, POTASSIUM CHLORIDE, CALCIUM CHLORIDE 600; 310; 30; 20 MG/100ML; MG/100ML; MG/100ML; MG/100ML
100 INJECTION, SOLUTION INTRAVENOUS CONTINUOUS
Status: DISCONTINUED | OUTPATIENT
Start: 2021-05-27 | End: 2021-05-27

## 2021-05-27 RX ORDER — HYDROMORPHONE HYDROCHLORIDE 1 MG/ML
0.5 INJECTION, SOLUTION INTRAMUSCULAR; INTRAVENOUS; SUBCUTANEOUS
Status: DISCONTINUED | OUTPATIENT
Start: 2021-05-27 | End: 2021-05-27

## 2021-05-27 RX ORDER — DICLOFENAC SODIUM 10 MG/G
2 GEL TOPICAL 4 TIMES DAILY
Status: DISCONTINUED | OUTPATIENT
Start: 2021-05-27 | End: 2021-06-03 | Stop reason: HOSPADM

## 2021-05-27 RX ORDER — CHOLECALCIFEROL (VITAMIN D3) 125 MCG
5 CAPSULE ORAL
Qty: 10 TABLET | Refills: 0 | Status: SHIPPED
Start: 2021-05-27 | End: 2021-06-23

## 2021-05-27 RX ORDER — HYDROXYZINE PAMOATE 25 MG/1
25 CAPSULE ORAL
Status: DISCONTINUED | OUTPATIENT
Start: 2021-05-27 | End: 2021-06-03 | Stop reason: HOSPADM

## 2021-05-27 RX ORDER — DICLOFENAC SODIUM 10 MG/G
2 GEL TOPICAL 4 TIMES DAILY
Qty: 100 G | Refills: 0 | Status: SHIPPED
Start: 2021-05-27 | End: 2021-06-23

## 2021-05-27 RX ORDER — TRAZODONE HYDROCHLORIDE 50 MG/1
50 TABLET ORAL
Status: DISCONTINUED | OUTPATIENT
Start: 2021-05-27 | End: 2021-06-03 | Stop reason: HOSPADM

## 2021-05-27 RX ORDER — LIDOCAINE 4 G/100G
2 PATCH TOPICAL EVERY 24 HOURS
Status: DISCONTINUED | OUTPATIENT
Start: 2021-05-27 | End: 2021-05-27 | Stop reason: HOSPADM

## 2021-05-27 RX ORDER — DULOXETIN HYDROCHLORIDE 20 MG/1
20 CAPSULE, DELAYED RELEASE ORAL DAILY
Status: DISCONTINUED | OUTPATIENT
Start: 2021-05-28 | End: 2021-06-03 | Stop reason: HOSPADM

## 2021-05-27 RX ORDER — LIDOCAINE 4 G/100G
PATCH TOPICAL
Qty: 30 PATCH | Refills: 0 | Status: SHIPPED
Start: 2021-05-28

## 2021-05-27 RX ORDER — FENTANYL 12.5 UG/1
1 PATCH TRANSDERMAL
Status: DISCONTINUED | OUTPATIENT
Start: 2021-05-27 | End: 2021-06-03 | Stop reason: HOSPADM

## 2021-05-27 RX ORDER — LIDOCAINE 4 G/100G
1 PATCH TOPICAL EVERY 24 HOURS
Status: DISCONTINUED | OUTPATIENT
Start: 2021-05-28 | End: 2021-06-03 | Stop reason: HOSPADM

## 2021-05-27 RX ORDER — PANTOPRAZOLE SODIUM 40 MG/1
40 TABLET, DELAYED RELEASE ORAL
Status: DISCONTINUED | OUTPATIENT
Start: 2021-05-27 | End: 2021-06-03 | Stop reason: HOSPADM

## 2021-05-27 RX ORDER — FENTANYL 12.5 UG/1
1 PATCH TRANSDERMAL
Qty: 5 PATCH | Refills: 0 | Status: SHIPPED
Start: 2021-05-29 | End: 2021-06-13

## 2021-05-27 RX ORDER — DULOXETIN HYDROCHLORIDE 20 MG/1
20 CAPSULE, DELAYED RELEASE ORAL 2 TIMES DAILY
Qty: 60 CAPSULE | Refills: 0 | Status: SHIPPED
Start: 2021-05-27

## 2021-05-27 RX ORDER — DICLOFENAC SODIUM 10 MG/G
2 GEL TOPICAL 4 TIMES DAILY
Status: DISCONTINUED | OUTPATIENT
Start: 2021-05-27 | End: 2021-05-27 | Stop reason: HOSPADM

## 2021-05-27 RX ORDER — LIDOCAINE 4 G/100G
1 PATCH TOPICAL EVERY 24 HOURS
Status: DISCONTINUED | OUTPATIENT
Start: 2021-05-27 | End: 2021-05-27

## 2021-05-27 RX ORDER — ACETAMINOPHEN 325 MG/1
650 TABLET ORAL
Qty: 30 TABLET | Refills: 0 | Status: SHIPPED
Start: 2021-05-27 | End: 2021-06-23

## 2021-05-27 RX ORDER — DULOXETIN HYDROCHLORIDE 20 MG/1
20 CAPSULE, DELAYED RELEASE ORAL 2 TIMES DAILY
Status: DISCONTINUED | OUTPATIENT
Start: 2021-05-27 | End: 2021-05-27 | Stop reason: HOSPADM

## 2021-05-27 RX ORDER — ACETAMINOPHEN 500 MG
1000 TABLET ORAL EVERY 8 HOURS
Status: DISCONTINUED | OUTPATIENT
Start: 2021-05-27 | End: 2021-05-27 | Stop reason: HOSPADM

## 2021-05-27 RX ADMIN — DICLOFENAC SODIUM 2 G: 10 GEL TOPICAL at 20:50

## 2021-05-27 RX ADMIN — HYDROMORPHONE HYDROCHLORIDE 0.5 MG: 1 INJECTION, SOLUTION INTRAMUSCULAR; INTRAVENOUS; SUBCUTANEOUS at 01:00

## 2021-05-27 RX ADMIN — DULOXETINE HYDROCHLORIDE 20 MG: 20 CAPSULE, DELAYED RELEASE ORAL at 08:09

## 2021-05-27 RX ADMIN — HYDROMORPHONE HYDROCHLORIDE 0.5 MG: 1 INJECTION, SOLUTION INTRAMUSCULAR; INTRAVENOUS; SUBCUTANEOUS at 03:49

## 2021-05-27 RX ADMIN — ACETAMINOPHEN 650 MG: 325 TABLET ORAL at 20:49

## 2021-05-27 RX ADMIN — ENOXAPARIN SODIUM 40 MG: 40 INJECTION SUBCUTANEOUS at 08:09

## 2021-05-27 RX ADMIN — Medication 10 ML: at 06:20

## 2021-05-27 RX ADMIN — TRAZODONE HYDROCHLORIDE 50 MG: 50 TABLET ORAL at 20:49

## 2021-05-27 RX ADMIN — Medication 5 MG: at 01:00

## 2021-05-27 RX ADMIN — DICLOFENAC SODIUM 2 G: 10 GEL TOPICAL at 15:46

## 2021-05-27 RX ADMIN — DICLOFENAC SODIUM 2 G: 10 GEL TOPICAL at 09:46

## 2021-05-27 RX ADMIN — PANTOPRAZOLE 40 MG: 40 TABLET, DELAYED RELEASE ORAL at 16:34

## 2021-05-27 RX ADMIN — SODIUM CHLORIDE, SODIUM LACTATE, POTASSIUM CHLORIDE, AND CALCIUM CHLORIDE 100 ML/HR: 600; 310; 30; 20 INJECTION, SOLUTION INTRAVENOUS at 03:00

## 2021-05-27 RX ADMIN — PANTOPRAZOLE SODIUM 40 MG: 40 TABLET, DELAYED RELEASE ORAL at 08:09

## 2021-05-27 RX ADMIN — ACETAMINOPHEN 650 MG: 325 TABLET ORAL at 00:39

## 2021-05-27 RX ADMIN — HYDROMORPHONE HYDROCHLORIDE 0.5 MG: 1 INJECTION, SOLUTION INTRAMUSCULAR; INTRAVENOUS; SUBCUTANEOUS at 08:09

## 2021-05-27 NOTE — DISCHARGE SUMMARY
Physician Discharge Summary       Patient: Vipin Gary MRN: [id number]  SSN: [id number]    YOB: 1931  Age: 80 y.o. Sex: female    PCP: Terre Cogan, MD    Allergies: Sulfa (sulfonamide antibiotics)    Admit date: 5/25/2021  Admitting Provider: Catarina Clinton MD    Discharge date: 5/27/2021  Discharging Provider: Gabby Adames MD    * Admission Diagnoses: Acute metabolic encephalopathy [Z54.31]  Suicide attempt ([address] Grace Kos    * Discharge Diagnoses:      1. Acute encephalopathy due to drug overdose, resolved  2. Hyponatremia stable  3. Suicidal attempt due to depression  4. Right hip pain due to severe osteoarthritis, better currently  5. GERD  6. Scoliosis  7. History of breast cancer  8. Mild intermittent chronic asthma without exacerbation    * Hospital Course: Patient presented to the hospital on May 25, 2021 with a complaint of drug overdose. Patient has history of breast cancer status post mastectomy, chronic lower extremity pain due to arthritis and scoliosis. Please refer hospital admission H&P for further detail. Poison control was contacted and our team was advised to watch this patient for withdrawals. Patient did not have any withdrawal symptoms. Her acute encephalopathy got better. CT head was done at the time of admission reported negative. Patient has right hip pain due to severe osteoarthritis causing depression and having suicidal ideation. Patient was seen by psychiatrist recommended Cymbalta. Patient was continue melatonin for insomnia. Psychiatrist also recommended inpatient psych treatment. With regards to right hip pain, x-ray was done that was consistent with severe osteoarthritis. However, patient had normal range of motion of right hip on the day of transfer to psych unit. She was also seen by PT OT recommended home health care. Her pain will be managed with fentanyl patch, lidocaine patch, as needed tramadol.   I also reached out to an orthopedic surgeon, Dr. Jolynn Macias and he will be following this patient as an outpatient upon discharge from psych unit. Patient will be continued on Nexium for GERD. Patient is otherwise medically cleared to be transferred to psych unit. * Procedures: None  * No surgery found *      Consults: Psychiatry and Orthopedic Surgery    Significant Diagnostic Studies: CT head, chest x-ray, x-ray right hip    Discharge Exam:  Please refer my progress note from 8/27/2021 for further detail    * Discharge Condition: improved  * Disposition: Inpatient psych unit    Discharge Medications:  Current Discharge Medication List      START taking these medications    Details   lidocaine 4 % patch Apply patch to right thigh and hip . Apply patch to the affected area for 12 hours a day and remove for 12 hours a day. Qty: 30 Patch, Refills: 0  Start date: 5/28/2021      DULoxetine (CYMBALTA) 20 mg capsule Take 1 Capsule by mouth two (2) times a day. Qty: 60 Capsule, Refills: 0  Start date: 5/27/2021      acetaminophen (TYLENOL) 325 mg tablet Take 2 Tablets by mouth every six (6) hours as needed for Pain. Indications: pain associated with arthritis  Qty: 30 Tablet, Refills: 0  Start date: 5/27/2021      diclofenac (VOLTAREN) 1 % gel Apply 2 g to affected area four (4) times daily. Gel should be measured and applied using the supplied dosing card. Apply dose (2 gm or 4 gm) to each location. If treatment site is the hands, patients should wait at least one (1) hour to wash their hands. APPLY TO lower back and right hip    Nursing, document site in comments  Qty: 100 g, Refills: 0  Start date: 5/27/2021      fentaNYL (DURAGESIC) 12 mcg/hr patch 1 Patch by TransDERmal route every seventy-two (72) hours for 15 days. Max Daily Amount: 1 Patch. Do not cut patch. Apply to hairless area of upper torso (chest, back, flank, or upper  arm). Apply on a different skin site from the previous location.  Remove old patch before new application. Qty: 5 Patch, Refills: 0  Start date: 5/29/2021, End date: 6/13/2021    Associated Diagnoses: Right hip pain      melatonin 5 mg tablet Take 1 Tablet by mouth nightly. Qty: 10 Tablet, Refills: 0  Start date: 5/27/2021         CONTINUE these medications which have NOT CHANGED    Details   ferrous sulfate (IRON) 325 mg (65 mg iron) tablet Take 65 mg by mouth Daily (before breakfast). traMADol (ULTRAM) 50 mg tablet Take 50 mg by mouth every six (6) hours as needed for Pain. beclomethasone (QVAR) 40 mcg/actuation aero Take 1 Puff by inhalation two (2) times a day. denosumab (PROLIA) 60 mg/mL injection 60 mg by SubCUTAneous route every 6 months. cholecalciferol, VITAMIN D3, (VITAMIN D3) 5,000 unit tab tablet Take 5,000 Units by mouth daily. Biotin 2,500 mcg cap Take 5,000 mcg by mouth.      multivitamin (ONE A DAY) tablet Take 1 Tab by mouth daily. ibandronate (BONIVA) 150 mg tablet Take 150 mg by mouth every thirty (30) days. aspirin 81 mg tablet Take 81 mg by mouth.        calcium 500 mg Tab Take 1,500 Units by mouth daily. esomeprazole (NEXIUM) 20 mg capsule Take 40 mg by mouth daily. STOP taking these medications       zolpidem (AMBIEN) 10 mg tablet Comments:   Reason for Stopping:         lidocaine (LIDODERM) 5 % Comments:   Reason for Stopping:         suvorexant (BELSOMRA) 5 mg tablet Comments:   Reason for Stopping:         ALPRAZolam (XANAX) 0.5 mg tablet Comments:   Reason for Stopping:               * Follow-up Care/Patient Instructions: Activity: PT/OT Eval and Treat  Diet: Cardiac Diet  Wound Care: None needed    Follow-up Information     Follow up With Specialties Details Why Contact Info    Janell Michaels MD Internal Medicine  U/S called office at 9:28 left vm message for the nurse to call me back with appointment.  Via [employer]          Follow-up with psychiatrist when patient arrives at  Follow-up with Dr. Henrik Estrella in a week upon discharge from psych unit    Total time to take care of this patient was 35 minutes and more than 50% of time was spent counseling and coordinating care. Disclaimer: Sections of this note are dictated using utilizing voice recognition software, which may have resulted in some phonetic based errors in grammar and contents. Even though attempts were made to correct all the mistakes, some may have been missed, and remained in the body of the document. If questions arise, please contact our department.       Signed:  Audrey Garrido MD  5/27/2021  9:34 AM

## 2021-05-27 NOTE — H&P
Psychiatry History and Physical    Subjective:     Date of Evaluation:  5/27/2021    Reason for Referral:  Geoff Carson was referred to the examiners from [facility] for SI/overdose. History of Presenting Problem:  Geoff Carson is a 81 yo F with PMH depression, arthritis who was admitted to [facility] for overdose. On admission her tox screen was positive for benzodiazepines. She endorses SI, no plan. Denies hallucinations. Her only complaint is the lond standing arthritis pain in her legs, no acute issues. Patient Active Problem List    Diagnosis Date Noted    Depression 05/27/2021    Severe protein-calorie malnutrition ([facility] [address] 05/26/2021    Suicide attempt ([facility] [address] 05/25/2021    Acute metabolic encephalopathy 69/21/3120    Complex renal cyst 12/18/2017     Past Medical History:   Diagnosis Date    Arthritis     Asthma     Cancer ([facility] [address]     hx of breast cancer    Disorder     scoliosis    Other ill-defined conditions(799.89)     osteoporosis,      Past Surgical History:   Procedure Laterality Date    BREAST SURGERY PROCEDURE UNLISTED      mastectomy - left    HX HEENT      cataract surgery with lens replacement       Family History   Problem Relation Age of Onset    Cancer Neg Hx     Diabetes Neg Hx     Heart Disease Neg Hx     Hypertension Neg Hx     Stroke Neg Hx       Social History     Tobacco Use    Smoking status: Never Smoker    Smokeless tobacco: Never Used   Substance Use Topics    Alcohol use: Yes     Alcohol/week: 0.8 standard drinks     Types: 1 Glasses of wine per week     Prior to Admission medications    Medication Sig Start Date End Date Taking? Authorizing Provider   lidocaine 4 % patch Apply patch to right thigh and hip . Apply patch to the affected area for 12 hours a day and remove for 12 hours a day. 5/28/21  Yes Alok Miller MD   DULoxetine (CYMBALTA) 20 mg capsule Take 1 Capsule by mouth two (2) times a day.  5/27/21  Yes Alok Miller MD   acetaminophen (TYLENOL) 325 mg tablet Take 2 Tablets by mouth every six (6) hours as needed for Pain. Indications: pain associated with arthritis 5/27/21  Yes Michelle Locke MD   diclofenac (VOLTAREN) 1 % gel Apply 2 g to affected area four (4) times daily. Gel should be measured and applied using the supplied dosing card. Apply dose (2 gm or 4 gm) to each location. If treatment site is the hands, patients should wait at least one (1) hour to wash their hands. APPLY TO lower back and right hip    Nursing, document site in comments 5/27/21  Yes Michelle Locke MD   ferrous sulfate (IRON) 325 mg (65 mg iron) tablet Take 65 mg by mouth Daily (before breakfast). Yes Provider, Historical   traMADol (ULTRAM) 50 mg tablet Take 50 mg by mouth every six (6) hours as needed for Pain. Yes Provider, Historical   cholecalciferol, VITAMIN D3, (VITAMIN D3) 5,000 unit tab tablet Take 5,000 Units by mouth daily. Yes Provider, Historical   Biotin 2,500 mcg cap Take 5,000 mcg by mouth. Yes Provider, Historical   multivitamin (ONE A DAY) tablet Take 1 Tab by mouth daily. Yes Provider, Historical   ibandronate (BONIVA) 150 mg tablet Take 150 mg by mouth every thirty (30) days. Yes Other, MD Rupert   aspirin 81 mg tablet Take 81 mg by mouth. Yes Other, MD Rupert   calcium 500 mg Tab Take 1,500 Units by mouth daily. Yes Other, MD Rupert   esomeprazole (NEXIUM) 20 mg capsule Take 40 mg by mouth daily. Yes Other, MD Rupert   fentaNYL (DURAGESIC) 12 mcg/hr patch 1 Patch by TransDERmal route every seventy-two (72) hours for 15 days. Max Daily Amount: 1 Patch. Do not cut patch. Apply to hairless area of upper torso (chest, back, flank, or upper  arm). Apply on a different skin site from the previous location. Remove old patch before new application. 5/29/21 6/13/21  Michelle Locke MD   melatonin 5 mg tablet Take 1 Tablet by mouth nightly.  5/27/21   Michelle Locke MD   beclomethasone (QVAR) 40 mcg/actuation aero Take 1 Puff by inhalation two (2) times a day. Provider, Historical   denosumab (PROLIA) 60 mg/mL injection 60 mg by SubCUTAneous route every 6 months. Provider, Historical     Allergies   Allergen Reactions    Sulfa (Sulfonamide Antibiotics) Unknown (comments)        Review of Systems - History obtained from chart review and the patient  General ROS: negative  Psychological ROS: positive for - depression and suicidal ideation  Ophthalmic ROS: negative  ENT ROS: negative  Respiratory ROS: negative  Cardiovascular ROS: negative  Gastrointestinal ROS: negative  Musculoskeletal ROS: positive for - joint pain  Neurological ROS: negative  Dermatological ROS: negative      Objective:     Patient Vitals for the past 8 hrs:   BP Temp Pulse Resp SpO2   05/27/21 1415 113/68 98 °F (36.7 °C) 100 20 100 %       Mental Status exam: WNL except for    Sensorium  oriented to time, place and person   Orientation situation   Relations cooperative   Eye Contact appropriate   Appearance:  age appropriate   Motor Behavior:  within normal limits   Speech:  soft   Vocabulary average   Thought Process: goal directed   Thought Content free of delusions and free of hallucinations   Suicidal ideations intention and no plan    Homicidal ideations no plan  and no intention   Mood:  depressed   Affect:  flat   Memory recent  adequate   Memory remote:  adequate   Concentration:  adequate   Abstraction:  concrete   Insight:  fair   Reliability fair   Judgment:  fair         Physical Exam:   General appearance: alert, cooperative, no distress, appears stated age  Head: Normocephalic, without obvious abnormality, atraumatic  Eyes: negative  Ears: normal TM's and external ear canals AU  Nose: Nares normal. Septum midline. Mucosa normal. No drainage or sinus tenderness.   Throat: Lips, mucosa, and tongue normal. Teeth and gums normal  Neck: supple, symmetrical, trachea midline and no adenopathy, bandage on right side, c.d.i  Lungs: clear to auscultation bilaterally  Heart: regular rate and rhythm, S1, S2 normal, no murmur, click, rub or gallop  Abdomen: soft, non-tende  Extremities: extremities normal, atraumatic, no cyanosis or edema  Skin: Skin color, texture, turgor normal. No rashes or lesions, some bruising on extremities  Neurologic: Grossly normal, using walker for assistance         Impression:      Active Problems:    Depression (5/27/2021)          Plan:     Recommendations for Treatment/Conditions:  Psychiatric treatment recommended while in hospital  Admit to inpatient psych for depression/SI    Referral To:    Inpatient psychiatric care          [place], [place]   5/27/2021 2:56 PM

## 2021-05-27 NOTE — DISCHARGE INSTRUCTIONS
Patient Education        Learning About Metabolic Encephalopathy  What is metabolic encephalopathy? Metabolic encephalopathy is a problem in the brain. It is caused by a chemical imbalance in the blood. The imbalance is caused by an illness or organs that are not working as well as they should. It is not caused by a head injury. When the imbalance affects the brain, it can lead to personality changes. It can also make it harder to think clearly and remember things. The problems may only last a short time if you get treatment right away. But this depends on the cause. If the imbalance has been building up because you've been sick for a long time, the mental changes may be more severe. They may also last longer. What happens when you have this problem? When things are working right, your body has many ways to keep the chemicals in your blood in balance. For example, your liver and kidneys remove waste from your blood. The kidneys also help keep fluids and sodium in balance. And your pancreas makes insulin. It is a hormone that helps control the amount of sugar in your blood. But the chemicals in your blood can get out of balance and damage parts of your body because of a medical problem. This may be kidney or liver failure. Or it could be diabetes that isn't controlled well. When the imbalance affects the brain, normal thinking and behavior can change. What are the symptoms? Symptoms may include:  · Confusion. · Problems with thinking and remembering. · Being grouchy and depressed. · Feeling drowsy. · Not being able to sleep. · Passing out (fainting) now and then. How is it treated? The doctor will try to find the illness that's causing the problem. He or she may ask questions about your past health. The doctor will also do tests to find what is causing the chemical imbalance and to see how severe it is. The doctor may treat the organ system that's causing the problem.  For example, if it's a kidney problem, you may have treatment to help your kidneys work better. If you have an infection, you may need antibiotics. If the doctor can't treat the cause of the problem, he or she will treat the symptoms. The doctor will carefully watch your blood chemicals to make sure that your treatment is being done safely. Follow-up care is a key part of your treatment and safety. Be sure to make and go to all appointments, and call your doctor if you are having problems. It's also a good idea to know your test results and keep a list of the medicines you take. Where can you learn more? Go to http://www.gray.com/  Enter N232 in the search box to learn more about \"Learning About Metabolic Encephalopathy. \"  Current as of: April 15, 2020               Content Version: 12.8  © 2006-2021 [employer]. Care instructions adapted under license by [employer] (which disclaims liability or warranty for this information). If you have questions about a medical condition or this instruction, always ask your healthcare professional. [address] any warranty or liability for your use of this information.

## 2021-05-27 NOTE — BSMART NOTE
Spoke with patient via telephone. Patient identity confirmed with staff assistance and 2 patient identifiers, full name and birthday. Patient confirmed she is voluntary for inpatient treatment. Patient admitted to [facility] inpatient by Dr Yon Hernandez.

## 2021-05-27 NOTE — GROUP NOTE
GARRY  GROUP DOCUMENTATION INDIVIDUAL Group Therapy Note Date: 5/27/2021 Group Start Time: 46 Group End Time: 0900 Group Topic: Medication [facility] ADULT CHEM DEP JUSTO Motta  GROUP DOCUMENTATION GROUP Group Therapy Note Attendees: 10 Attendance: Did not attend Renard Carney RN

## 2021-05-27 NOTE — BH NOTES
ELIOT Admission Note: Pt. Arrived on the unit via wheelchair with nurse and hospital security and floor staff upon admission to behavior medicine.

## 2021-05-27 NOTE — PROGRESS NOTES
Hospitalist Progress Note    Subjective:   Daily Progress Note: 5/27/2021     Patient was seen in presence of sitter. Patient is feeling much better currently. No headaches or dizziness. She wants to eat. No nausea vomiting. No chest pain or palpitation. No shortness of breath or cough. No abdominal pain. She does not complain of any leg pain currently. She says her pain is in lower back around the right hip and radiates down to the leg off and on especially on exertion. No fall. She states she has had this pain for a while and has been seeing a rheumatologist and getting Prolia. She lives by herself but her son keeps an eye on her as per patient. She thinks her suicidal ideation is getting better. She is willing to get help for her depression.     Current Facility-Administered Medications   Medication Dose Route Frequency    DULoxetine (CYMBALTA) capsule 20 mg  20 mg Oral BID    acetaminophen (TYLENOL) tablet 1,000 mg  1,000 mg Oral Q8H    melatonin tablet 5 mg  5 mg Oral QHS    lidocaine 4 % patch 2 Patch  2 Patch TransDERmal Q24H    diclofenac (VOLTAREN) 1 % topical gel 2 g  2 g Topical QID    pantoprazole (PROTONIX) tablet 40 mg  40 mg Oral BID    fentaNYL (DURAGESIC) 12 mcg/hr patch 1 Patch  1 Patch TransDERmal Q72H    sodium chloride (NS) flush 5-40 mL  5-40 mL IntraVENous Q8H    sodium chloride (NS) flush 5-40 mL  5-40 mL IntraVENous PRN    acetaminophen (TYLENOL) tablet 650 mg  650 mg Oral Q6H PRN    Or    acetaminophen (TYLENOL) suppository 650 mg  650 mg Rectal Q6H PRN    polyethylene glycol (MIRALAX) packet 17 g  17 g Oral DAILY PRN    promethazine (PHENERGAN) tablet 12.5 mg  12.5 mg Oral Q6H PRN    Or    ondansetron (ZOFRAN) injection 4 mg  4 mg IntraVENous Q6H PRN    enoxaparin (LOVENOX) injection 40 mg  40 mg SubCUTAneous DAILY    hydrALAZINE (APRESOLINE) 20 mg/mL injection 10 mg  10 mg IntraVENous Q6H PRN        Review of Systems  Pertinent items are noted in HPI.    Objective:     Visit Vitals  /63 (BP 1 Location: Left upper arm, BP Patient Position: At rest)   Pulse (!) 109   Temp 98.3 °F (36.8 °C)   Resp 17   Ht 5' 4\" (1.626 m)   Wt 47.5 kg (104 lb 11.2 oz)   SpO2 96%   BMI 17.97 kg/m²      O2 Device: None (Room air)    Temp (24hrs), Av.3 °F (36.8 °C), Min:97.8 °F (36.6 °C), Max:98.5 °F (36.9 °C)    701 - 1900  In: 111.7 [I.V.:111.7]  Out: -    190 -  0700  In: 1300 [I.V.:1300]  Out: 500 [Urine:500]    General appearance - alert, follows commands appropriately, and in no distress  Eyes - sclera anicteric, no pallor  Nose - no obvious nasal discharge. Neck - supple, no JVD, trachea is midline  Chest -clear air entry noted in bases, no wheezes  Heart - S1 and S2 normal  Abdomen - soft, nontender, nondistended, Bowel sounds present  Neurological -awake, follows commands appropriately, motor strength 5-5 in all 4 extremities while in bed, no point tenderness noted around right hip, normal straight leg raising test bilaterally. Musculoskeletal - no joint tenderness or erythema of knees bilaterally. No point tenderness noted around the lumbar spine.   Extremities - no pedal edema noted    Data Review    Recent Results (from the past 24 hour(s))   GLUCOSE, POC    Collection Time: 21  8:57 PM   Result Value Ref Range    Glucose (POC) 101 70 - 342 mg/dL   METABOLIC PANEL, COMPREHENSIVE    Collection Time: 21  5:39 AM   Result Value Ref Range    Sodium 133 (L) 136 - 145 mmol/L    Potassium 3.9 3.5 - 5.5 mmol/L    Chloride 103 100 - 111 mmol/L    CO2 23 21 - 32 mmol/L    Anion gap 7 3.0 - 18 mmol/L    Glucose 115 (H) 74 - 99 mg/dL    BUN 10 7.0 - 18 MG/DL    Creatinine 0.73 0.6 - 1.3 MG/DL    BUN/Creatinine ratio 14 12 - 20      GFR est AA >60 >60 ml/min/1.73m2    GFR est non-AA >60 >60 ml/min/1.73m2    Calcium 8.6 8.5 - 10.1 MG/DL    Bilirubin, total 0.3 0.2 - 1.0 MG/DL    ALT (SGPT) 15 13 - 56 U/L    AST (SGOT) 18 10 - 38 U/L Alk. phosphatase 83 45 - 117 U/L    Protein, total 6.8 6.4 - 8.2 g/dL    Albumin 2.8 (L) 3.4 - 5.0 g/dL    Globulin 4.0 2.0 - 4.0 g/dL    A-G Ratio 0.7 (L) 0.8 - 1.7     CBC WITH AUTOMATED DIFF    Collection Time: 05/27/21  5:39 AM   Result Value Ref Range    WBC 9.8 4.6 - 13.2 K/uL    RBC 4.19 (L) 4.20 - 5.30 M/uL    HGB 11.6 (L) 12.0 - 16.0 g/dL    HCT 35.6 35.0 - 45.0 %    MCV 85.0 74.0 - 97.0 FL    MCH 27.7 24.0 - 34.0 PG    MCHC 32.6 31.0 - 37.0 g/dL    RDW 17.8 (H) 11.6 - 14.5 %    PLATELET 145 985 - 868 K/uL    MPV 8.9 (L) 9.2 - 11.8 FL    NEUTROPHILS 80 (H) 40 - 73 %    LYMPHOCYTES 8 (L) 21 - 52 %    MONOCYTES 9 3 - 10 %    EOSINOPHILS 2 0 - 5 %    BASOPHILS 0 0 - 2 %    ABS. NEUTROPHILS 7.8 1.8 - 8.0 K/UL    ABS. LYMPHOCYTES 0.8 (L) 0.9 - 3.6 K/UL    ABS. MONOCYTES 0.9 0.05 - 1.2 K/UL    ABS. EOSINOPHILS 0.2 0.0 - 0.4 K/UL    ABS. BASOPHILS 0.0 0.0 - 0.1 K/UL    DF AUTOMATED           Assessment/Plan:     ASSESSMENT:    1. Acute encephalopathy due to drug overdose, resolved  2. Hyponatremia stable  3. Suicidal attempt due to depression  4. Right hip pain due to severe osteoarthritis, better currently  5. GERD  6. Scoliosis  7. History of breast cancer  8. Mild intermittent chronic asthma without exacerbation    PLAN:    Will add lidocaine patch with Voltaren in addition to fentanyl patch for #4. X-ray right hip report reviewed. Continue Cymbalta that will help with depression and pain. Continue PPI for #5  Orthopedic has been consulted so they can follow this patient as an outpatient for #4  PT and OT recommendation noted about home health care  Psych recommendation noted about inpatient psych needed. I spoke to crisis about evaluating this patient and arrange transfer to inpatient psych unit. I spoke to patient's son as well as daughter-in-law over the phone and explained them the situation.   They are aware that patient has to go to inpatient psych unit as it was recommended by psychiatrist. However patient can come home if cleared by psychiatrist with outpatient orthopedic follow-up. Patient is otherwise medically stable to be transferred to inpatient psych unit. Total time to take care of this patient was 35 minutes and more than 50% of time was spent counseling and coordinating care. Disclaimer: Sections of this note are dictated using utilizing voice recognition software, which may have resulted in some phonetic based errors in grammar and contents. Even though attempts were made to correct all the mistakes, some may have been missed, and remained in the body of the document. If questions arise, please contact our department.

## 2021-05-27 NOTE — PROGRESS NOTES
Problem: Pressure Injury - Risk of  Goal: *Prevention of pressure injury  Description: Document German Scale and appropriate interventions in the flowsheet. Outcome: Progressing Towards Goal  Note: Pressure Injury Interventions:  Sensory Interventions: Assess changes in LOC, Assess need for specialty bed, Avoid rigorous massage over bony prominences    Moisture Interventions: Absorbent underpads, Assess need for specialty bed, Check for incontinence Q2 hours and as needed    Activity Interventions: Assess need for specialty bed, Pressure redistribution bed/mattress(bed type)    Mobility Interventions: Assess need for specialty bed, Pressure redistribution bed/mattress (bed type), Turn and reposition approx. every two hours(pillow and wedges)    Nutrition Interventions: Document food/fluid/supplement intake    Friction and Shear Interventions: Apply protective barrier, creams and emollients, Feet elevated on foot rest, Foam dressings/transparent film/skin sealants                Problem: Patient Education: Go to Patient Education Activity  Goal: Patient/Family Education  Outcome: Progressing Towards Goal     Problem: Falls - Risk of  Goal: *Absence of Falls  Description: Document Preston Fall Risk and appropriate interventions in the flowsheet.   Outcome: Progressing Towards Goal  Note: Fall Risk Interventions:       Mentation Interventions: Adequate sleep, hydration, pain control, Bed/chair exit alarm, Evaluate medications/consider consulting pharmacy, Room close to nurse's station    Medication Interventions: Bed/chair exit alarm, Evaluate medications/consider consulting pharmacy, Patient to call before getting OOB    Elimination Interventions: Bed/chair exit alarm, Call light in reach, Patient to call for help with toileting needs, Stay With Me (per policy)              Problem: Patient Education: Go to Patient Education Activity  Goal: Patient/Family Education  Outcome: Progressing Towards Goal     Problem: Patient Education: Go to Patient Education Activity  Goal: Patient/Family Education  Outcome: Progressing Towards Goal     Problem: Nutrition Deficit  Goal: *Optimize nutritional status  Outcome: Progressing Towards Goal

## 2021-05-27 NOTE — PROGRESS NOTES
Bedside, Verbal, and Written shift change report given to Kaylie Bedoya RN (oncoming nurse) by Tawanna Briceño RN (offgoing nurse). Report included the following information SBAR, Kardex, Intake/Output, MAR, Recent Results, and Cardiac Rhythm NSR. I have reviewed discharge instructions with the patient. The patient verbalized understanding. IVs removed, AVS signed and placed in chart, patient belongings packed.     Report given to inpatient psych unit

## 2021-05-27 NOTE — PROGRESS NOTES
Comprehensive Nutrition Assessment    Type and Reason for Visit: Initial, Positive nutrition screen    Nutrition Recommendations/Plan:   - Continue regular diet  - Add supplements: Ensure Enlive, once daily & Magic Cup, BID    Nutrition Assessment:  Transferred to behavior medicine unit s/p acute care hospitalization following overdose. Pt reports fair intake of meals today with good appetite but only will eat well when she likes the meal. NFPE completed. Malnutrition Assessment:  Malnutrition Status:  Severe malnutrition    Context:  Chronic illness     Findings of the 6 clinical characteristics of malnutrition:   Energy Intake:  No significant decrease in energy intake  Weight Loss:  Unable to assess     Body Fat Loss:  7 - Severe body fat loss, Fat overlying ribs, Orbital   Muscle Mass Loss:  7 - Severe muscle mass loss, Clavicles (pectoralis &deltoids), Hand (interosseous), Temples (temporalis)  Fluid Accumulation:  Unable to assess,     Strength:  Not performed     Nutrition History and Allergies: PMHx- breast cancer s/p left mastectomy, chronic LE pain and arthritis. Reports good po intake PTA, 3 meals small meals/day with snack. Denies changes in wt with UBW of 103-106#. NKFA.     Estimated Daily Nutrient Needs:  Energy (kcal): 6375-6760; Weight Used for Energy Requirements: Current (48 kg)  Protein (g): 58-72; Weight Used for Protein Requirements: Current (1.2-1.5)  Fluid (ml/day): 8846-7427; Method Used for Fluid Requirements: 1 ml/kcal    Nutrition Related Findings:  None      Wounds:    None       Current Nutrition Therapies:  DIET REGULAR    Anthropometric Measures:  · Height:  5' 4\" (162.6 cm)  · Current Body Wt:  47.5 kg (104 lb 11.5 oz)   · Admission Body Wt:  104 lb 11.5 oz    · Usual Body Wt:  48.1 kg (106 lb)     · Ideal Body Wt:  120 lbs:  87.3 %   · BMI Category:  Underweight (BMI less than 22) age over 72       Nutrition Diagnosis:   · Severe malnutrition, In context of chronic illness related to early satiety, psychological cause or life stress (advanced age) as evidenced by severe loss of subcutaneous fat, severe muscle loss    Nutrition Interventions:   Food and/or Nutrient Delivery: Continue current diet, Start oral nutrition supplement  Nutrition Education and Counseling: Education not indicated  Coordination of Nutrition Care: Continue to monitor while inpatient (pt discussed with nursing)    Goals:  PO nutrition intake will meet >75% of patient estimated nutritional needs within the next 7 days.        Nutrition Monitoring and Evaluation:   Behavioral-Environmental Outcomes: None identified  Food/Nutrient Intake Outcomes: Food and nutrient intake, Supplement intake  Physical Signs/Symptoms Outcomes: GI status, Meal time behavior, Nutrition focused physical findings    Discharge Planning:    Continue oral nutrition supplement, Continue current diet     Electronically signed by Benny Ascencio RD on 5/27/2021 at 4:15 PM    Contact: [phone]

## 2021-05-27 NOTE — PROGRESS NOTES
Nursing called to report patient's pain is not controlled. She noted pain 10/10 and asked for better pain management. She is on fentanyl patch. Ultram q12 is not helping    Add dilaudid q3hr prn, increase cymbalta.  Add standing tylenol       Mendoza Bryson MD

## 2021-05-27 NOTE — PROGRESS NOTES
1915-Received report/assumed care of pt.    2000-Pt assessment done. Pt resting in bed, avoids conversation at this time. Sitter at bedside. 2238-Scheduled Tramadol given for pain. 0039-Pt complaining of pain to right hip and leg. Tramadol was already given. Tylenol given at this time. 0050-Pt complaining of pain that is not relieved with pain medication that was given, pain is 10/10 or more than 10 per pt statement. Pt states she is too old to live this way and she shouldn't have to live. Pt states pain is too much for her. Pt also states that she takes a sleeping medication at night but she can't remember what it is. Pt crying at this time. Dr. Coby Acosta notified by test message/[employer] and by phone call about pt's condition. Dr. Coby Acosta states he will put in new orders to help with pt's pain. 0100-Dilaudid and Melatonin given. 0220-Pt complaining that her pain is starting to come back strong even though the pain medication did work for a while. Pt's HR staying between 105 and 140's. Dr. Coby Acosta made aware through text message/[employer].

## 2021-05-27 NOTE — BH NOTES
ELIOT Admission Note: Pt was checked for contraband upon admission on the unit. Pt was given a copy of the rules upon admission. Pt was receptive to getting there picture taken at this time during this shift.

## 2021-05-28 PROBLEM — F32.2 MAJOR DEPRESSIVE DISORDER, SINGLE EPISODE, SEVERE WITHOUT PSYCHOTIC FEATURES (HCC): Status: ACTIVE | Noted: 2021-05-28

## 2021-05-28 PROCEDURE — 65220000003 HC RM SEMIPRIVATE PSYCH

## 2021-05-28 PROCEDURE — 99222 1ST HOSP IP/OBS MODERATE 55: CPT | Performed by: PSYCHIATRY & NEUROLOGY

## 2021-05-28 PROCEDURE — 74011000250 HC RX REV CODE- 250: Performed by: PSYCHIATRY & NEUROLOGY

## 2021-05-28 PROCEDURE — 74011250637 HC RX REV CODE- 250/637: Performed by: PSYCHIATRY & NEUROLOGY

## 2021-05-28 RX ADMIN — DULOXETINE HYDROCHLORIDE 20 MG: 20 CAPSULE, DELAYED RELEASE ORAL at 08:16

## 2021-05-28 RX ADMIN — TRAZODONE HYDROCHLORIDE 50 MG: 50 TABLET ORAL at 20:14

## 2021-05-28 RX ADMIN — DICLOFENAC SODIUM 2 G: 10 GEL TOPICAL at 15:59

## 2021-05-28 RX ADMIN — DICLOFENAC SODIUM 2 G: 10 GEL TOPICAL at 09:49

## 2021-05-28 RX ADMIN — PANTOPRAZOLE 40 MG: 40 TABLET, DELAYED RELEASE ORAL at 16:25

## 2021-05-28 RX ADMIN — DICLOFENAC SODIUM 2 G: 10 GEL TOPICAL at 12:01

## 2021-05-28 RX ADMIN — PANTOPRAZOLE 40 MG: 40 TABLET, DELAYED RELEASE ORAL at 07:30

## 2021-05-28 RX ADMIN — DICLOFENAC SODIUM 2 G: 10 GEL TOPICAL at 20:16

## 2021-05-28 RX ADMIN — ACETAMINOPHEN 650 MG: 325 TABLET ORAL at 06:29

## 2021-05-28 NOTE — GROUP NOTE
[facility] GROUP DOCUMENTATION INDIVIDUAL Group Therapy Note Date: 5/28/2021 Group Start Time: 46 Group End Time: 0900 Group Topic: Medication [facility] 1 ADULT CHEM DEP Luz Mcardle, RN 
 
[facility] GROUP DOCUMENTATION GROUP Group Therapy Note Attendees: 9 Attendance: Attended Patient's Goal:  Medication compliance Interventions/techniques: Informed Follows Directions: Followed directions Interactions: Interacted appropriately Mental Status: Depressed Behavior/appearance: Cooperative Goals Achieved: Able to self-disclose Regina Wheeler RN

## 2021-05-28 NOTE — PROGRESS NOTES
Patient has been resting in room most of evening shift. Patient did use hallway phone twice this shift, has eaten meals and snacks and has been compliant with scheduled medications. Patient was given PRN Tylenol for back pain and PRN Trazodone tab PO per patient request for sleep. Patient's Lidocaine patch was removed by this nurse. Patient was appreciative of staff assistance, has been compliant with unit guidelines, free from falls and harm. Will continue to monitor and provide interventions as appropriate.

## 2021-05-28 NOTE — GROUP NOTE
[facility]  GROUP DOCUMENTATION INDIVIDUAL Group Therapy Note Date: 5/28/2021 Group Start Time: 3080 Group End Time: 5090 Group Topic: Nursing [facility] 1 ADULT CHEM DEP Jhoan Santoyo RN 
 
[facility] GROUP DOCUMENTATION GROUP Group Therapy Note Attendees: 6 Attendance: Attended Patient's Goal:  Things that make me happy and proud. Interventions/techniques: Art integration, Informed and Promoted peer support Follows Directions: Followed directions Interactions: Interacted appropriately Mental Status: Calm Behavior/appearance: Cooperative Goals Achieved: Able to engage in interactions and Identified feelings Lina Steele RN

## 2021-05-28 NOTE — PROGRESS NOTES
Problem: Suicide  Goal: *STG: Attends activities and groups  Description: Attends 2 groups minimum per day  Outcome: Progressing Towards Goal  Goal: *STG/LTG: Complies with medication therapy  Description: Takes medications as prescribed daily  Outcome: Progressing Towards Goal  Goal: Interventions  Outcome: Progressing Towards Goal  Assess symptoms      Jose Juan Mcfarland is compliant with medications and meals. She ambulates with a walker, and she is free from falls and harm. She has reported significant pain relief, specifically in her R hip, since being at the hospital. On the [facility] unit, she reports she had some breakthrough pain at a level 5, which was relieved with Tylenol. Pt reports much relief from topical Voltaren. In regards to her mood, today she told me \"Honestly, I don't feel better, however I don't want to hurt myself anymore\". We discussed different options for seeking pain management outside of the hospital. Pt had clothes brought to her, she was appreciative of getting out of hospital clothing. Will continue to provide support as needed.

## 2021-05-28 NOTE — PROGRESS NOTES
conducted an initial consultation and Spiritual Assessment for Queenie Jj, who is a 80 y.o.,female. Patient's Primary Language is: [place]. According to the patient's EMR Mandaeism Affiliation is: Venkatesh Santana.     The reason the Patient came to the hospital is:   Patient Active Problem List    Diagnosis Date Noted    Major depressive disorder, single episode, severe without psychotic features ([facility] [address] [facility] 05/28/2021    Severe protein-calorie malnutrition ([facility] 05/26/2021    Suicide attempt ([facility] 05/25/2021    Acute metabolic encephalopathy 64/50/0311    Complex renal cyst 12/18/2017        The  provided the following Interventions:  Introduced myself and Initiated a relationship of care and support. Explored issues of jaelyn, belief, spirituality and Taoist/ritual needs while hospitalized. Listened empathically as patient shared about a little about his family medical history   Encouraged patient to request a  as needed    The following outcomes where achieved:  Patient shared limited information about her medical narrative   Patient processed feeling about current hospitalization. Plan:  Chaplains will continue to follow and will provide pastoral care on an as needed/requested basis.  recommends bedside caregivers page  on duty if patient shows signs of acute spiritual or emotional distress.     [address] [place] [address]   [phone]

## 2021-05-28 NOTE — GROUP NOTE
[facility]  GROUP DOCUMENTATION INDIVIDUAL Group Therapy Note Date: 5/27/2021 Group Start Time: 1 Group End Time: 2000 Group Topic: Nursing [facility] 1 ADULT CHEM DEP Jaswinder Jackson, RN 
 
[facility]  GROUP DOCUMENTATION GROUP Group Therapy Note:  Using humor for coping. Attendees: 5 Attendance: Did not attend Additional Notes:  Patient declined group despite staff encouragement. [facility]

## 2021-05-28 NOTE — BSMART NOTE
SOCIAL WORK GROUP THERAPY PROGRESS NOTE Group Time:  10:45am 
 
Group Topic:  Coping Skills Group Participation:  
 
Pt reportedly did not attend group due to medical issues as was resting in room then meeting with nursing staff about personal issues.

## 2021-05-28 NOTE — BSMART NOTE
[facility] Biopsychosocial Assessment Current Level of Psychosocial Functioning  
 
[x]Independent 
[]Dependent []Minimal Assist 
 
 
Comments:   
 
Psychosocial High Risk Factors (check all that apply) []Unable to obtain meds                                                              
[x]Chronic illness/pain []Substance abuse  
[]Lack of Family Support []Financial stress [x]Isolation []Inadequate [employer] [x]Suicide attempt(s) []Not taking medications []Victim of crime []Developmental Delay 
[]Unable to manage personal needs [x]Age 72 or older  
[]  Homeless []Carissa transportation []Readmission within 30 days []Unemployment []Traumatic Event Psychiatric Advanced Directive: none noted. Family to involve in treatment:  son & grandson Sexual Orientation:  heterosexual 
 
Patient Strengths: polite, respectful, apparently honest 
 
Patient Barriers: isolates, few activities, doesn't ask for help CD education provided: johnna [employer] Safety plan: will contract with nursing staff for safety CMHC/MH history: no prior inpt or outpt mental health Tx 
 
Plan of Care: 
medication management, group/individual therapies, family meetings, psycho -education, treatment team meetings to assist with stabilization Initial Discharge Plan:  Refer to local private practice in [place]. Clinical Summary:   
 
Pt is bright, articulate x80 yr old female transferred from [facility] after major medication overdose triggered apparently by pain management issues. She called daughter in law & told what she had done, EMT's followed. Pt angry not successful. Still dysphoric, minimizing need for tx. Pt's  of x50 + years ,  son & grandson live in area & check pt regularly, who lives in own home by herself & hopes to return there.  She retired in mid 18's from [employer].  
 
No prior outpt or inpt for Mental Health CLINICAL IMPRESSION: 
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 Rule out major depressive disorder, single, without psychotic symptoms. AXIS II:  Noncontributory. AXIS III:  Status post overdose without any evidence of sequela. Chronic arthritis both knees and hips with secondary chronic pain and scoliosis status post left mastectomy, remote. Intervention: pt was resting in room & had completed assessment with her Dr and not wanting to talk more until after lunch. Staff reviewed with her structure of Adult unit & potential benefit of group participation to help process triggers for self harm as well as give her some relief & direction for the future. Dr & tx team updated.

## 2021-05-28 NOTE — H&P
[address] [facility] - [place], [place] HISTORY AND PHYSICAL    Name:  Lucho Kulkarni  MR#:   [id number]  :  10/23/1931  ACCOUNT #:  [de-identified]  ADMIT DATE:  2021      IDENTIFYING INFORMATION:  The patient is an 63-year-old female [facility] this facility [facility]. BASIS FOR ADMISSION:  The patient is known to the undersigned by virtue of my being requested to see her for the purpose of this psychiatric consultation on 2021, this happening shortly after the patient had been admitted to the facility after taking a rather severe medication overdose. Considered to be suicidal, the patient was requested to be seen for a psychiatric consultation with attention being invited to my dictated consultation report which is self-explanatory. Regardless, it is to be mentioned that the patient who, as stated, was evaluated by the undersigned shortly after admission had described, when seen then, a history of severe pain in association to what appears to be osteoarthritis of her knees and hips for which she has been able to be prescribed only with acetaminophen. A prescription for a low dose of tramadol had been also started; however, it appeared that tramadol worked for a short period of time and later on stopped working. This was the main issue for which, describing having severe pain, the patient took the above-mentioned overdose to the point of being found by EMS when their services were summoned by the patient's daughter-in-law after she had called her and indicating that she had taken an overdose and she was going to be found dead. After the patient was medically cleared, it was then decided that for obvious reasons, she required continuous psychiatric care due to the severity of her depression and also her potential for harm.   For that reason, the patient was then transferred to our facility voluntarily, and so the basis for this admission. PSYCHIATRIC HISTORY:  The patient has never been treated psychiatrically before; however, she does describe being profoundly depressed in association to the above-mentioned pain. She mentioned that her overdose was not an impulsive action, she had actually thought about doing so for a period of 6 months or so. This resulted as I have above mentioned with when she noted that her bills had been paid, her house was taken care of, that she felt that she was ready to do so, and proceeded to take the multi-medications overdose. It is not clear to me as to which medications she overdosed with; however, when the EMS arrived, she was found to be unresponsive. Two doses of Narcan were given, one at the patient's house, the second one on her way to the hospital; however, she did not respond to them. After she was brought to the emergency room, the patient was described as beginning to be responsive. She was described as being angry as to why she had been brought to the emergency room since she wanted to be dead and she had decided because again of the severity of the patient's pain. So, as the patient was medically cleared, it was then decided that she continues to require inpatient psychiatric care, something that we had suggested. This time after she had been provided with a prescription of a Duragesic patch 12.5 mg to be applied every 72 hours with some evidence of relief of her pain being noted, she agreed to come to the psychiatric hospital voluntarily. She had been started by the way on a low prescription for duloxetine while she was in the [facility] for a dose of 20 mg daily. MEDICAL HISTORY:  Attention is invited to the patient's admission note and also to the discharge summary note which is self-explanatory.   Admitting diagnosis to the [facility] included acute encephalopathy due to the overdose, which had been considered to be resolved, chronic history of hyponatremia, considered to be stable, right hip pain due to severe osteoarthritis, GERD, scoliosis, prior history of breast cancer with left mastectomy, and a mild intermittent chronic asthma without exacerbation. Multiple labs were performed at the time of the patient's evaluation at the emergency room them being described in the psychiatric consultation note done by the undersigned, and so we will not repeat them with a great deal of detail, however, the reader is advised that the tests included a CBC which showed the presence of anemia. A CMP showed a low sodium 129 with normal kidney functioning tests and normal blood sugar of 87, normal liver function tests also noted. Coagulation studies were normal.  Acetaminophen levels 12 mcg/mL. Salicylate levels below 1.7 mg/dL. Normal TSH of 0.62 noted. An electrocardiogram showed no evidence of acute findings with normal QT and QTc elongations. A CT of the head showed no evidence of an acute intracranial process showing still evidence however of chronic small vessel ischemic changes; however, no evidence from a clinical point of view of her showing a dementing process. Other studies included x-rays of the right hip which showed the presence of severe osteoarthritis with severe degenerative findings of the lumbar spine partially imaged. It is to be mentioned that a repeat of the patient's CMP on 05/27/2021 had shown a sodium 133, potassium 3.9, chloride of 103, BUN 10, creatinine 0.73, blood sugar 115, estimated GFR above 60 mL/min, and normal liver function tests again. ALLERGIES:  THE PATIENT IS DESCRIBED TO BE ALLERGIC TO SULFA DRUGS, THE TYPE OF REACTION NOT SPECIFIED. ALCOHOL AND DRUG HISTORY:  Negative for alcohol and illicit substances, abusing over-the-counter medications or prescription medications.     PERSONAL HISTORY:  The patient has been retired since 1984 or 80, she used to be the  at the [employer] in [place], [place]. 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 She was  to her  for 50 years or so, she says. The couple had a son with the son's wife being very close to the patient. \"I prefer to talk to her about anything that is emotionally related since my son does not handle emotions very well. \"  It was the daughter-in-law the person that the patient had called to let her know that she was going to die. She has a grandson who is  and has two children, the patient's great-grandchildren whom are apparently also very close to her. The patient resides by herself in her home. She lives in [facility], she says, and is very proud of what her and her  have done for the home in itself. She is not willing at least as of now to leave the home and to be placed. MENTAL STATUS EXAM:  This is a thin female who looks her stated age. There is no evidence of alcohol or any type of drug-related signs of intoxication or withdrawal symptoms. There is no evidence of adverse effects in association to current prescription for a fentanyl patch. During the evaluation, she was found to be coherent, showing quality of continuity of associations without evidence of flight of ideas, pressure of speech, ideas of reference or influence or any hallucinatory process. She is depressed she says; however, with current treatment, she feels better and she does not feel suicidal at present. She denies suicidal intent specifically and she does not want to harm anyone else. Her cognition is intact. Her memory is intact and she is considered to have capacity. 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 Rule out major depressive disorder, single, without psychotic symptoms. AXIS II:  Noncontributory. AXIS III:  Status post overdose without any evidence of sequela.   Chronic arthritis both knees and hips with secondary chronic pain and scoliosis status post left mastectomy, remote. Status post cataract surgery with lens replacement, remote. Iron deficiency anemia, under treatment. Hyponatremia, chronic. History of allergy to sulfa drugs. TREATMENT PLAN:  1. The patient was admitted to the adult program, will be seen daily, and will be referred to the groups within the context of the program.  2.  The hospitalist group had contacted Dr. Ok Daniel to follow the patient as needed. We are going to give a call to his office to see if he could help us in regard to any treatment that the patient could have. Again, she is already with a Duragesic patch. I am not sure if Dr. Ok Daniel will be agreeable to write prescriptions for a controlled substance; however, the patient again makes a direct relationship between her pain and her suicidality. 3.  She appears to have a chronic history of hyponatremia. We are very well aware that actually duloxetine will produce some problems with hyponatremia as many of the antidepressants. We will request a BMP to be drawn in several days; however, the patient herself has been advised that I will not be here for 10 days or so since I will be off on vacation. I will be asking Dr. Fabby Paez to please cover the patient in my absence. She will have an inpatient  assigned whom we are going to ask to call the patient's family as soon as possible. 4.  The patient will be requested to be followed by one of our nutritionist with a request being asked to see her. Physical Therapy consultation also will follow. 5.  In regard to the prescription for duloxetine, we will have to observe how her sodium level is, however, if it begins to drop because of the low dose of duloxetine the patient has been started at 20 mg a day, a switch very possibly to mirtazapine at low-dose also will follow.     ESTIMATED LENGTH OF STAY AND PROGNOSIS:  Very difficult to determine the specific length of stay since it will be determined by the ability the patient will have to be able to have her pain under control and follow-up appointments in that respect to be secure. It is not clear if the patient's family will suggest the patient to be referred to an long term which makes clinical sense since she will be observed there; however, herself, she wants to go back home as she stated several times today. So the estimated length of stay as of now is 7 days. Prognosis will depend upon treatment response and treatment compliance. Zulema Fink MD, LFAPA      [id number]  D:  05/28/2021 10:28  T:  05/28/2021 14:04  JOB #:  [id number]    Behavioral Services  Medicare Certification Upon Admission    I certify that this patient's inpatient psychiatric hospital admission is medically necessary for:      [x] Treatment which could reasonably be expected to improve this patient's condition,       [] For diagnostic study;     AND     [x] The inpatient psychiatric services are provided while the individual is under the care of a physician and are included in the individualized plan of care. Estimated length of stay/service is 1 week. Plan for post-hospital care: The patient will require outpatient follow-up. Discharge suggestion for the patient to be referred to pain management should be discussed with the patient family.     Electronically signed by [unfilled] on 5/28/2021 at 6:41 PM

## 2021-05-29 PROCEDURE — 65220000003 HC RM SEMIPRIVATE PSYCH

## 2021-05-29 PROCEDURE — 74011000250 HC RX REV CODE- 250: Performed by: PSYCHIATRY & NEUROLOGY

## 2021-05-29 PROCEDURE — 97162 PT EVAL MOD COMPLEX 30 MIN: CPT

## 2021-05-29 PROCEDURE — 99231 SBSQ HOSP IP/OBS SF/LOW 25: CPT | Performed by: PSYCHIATRY & NEUROLOGY

## 2021-05-29 PROCEDURE — 74011250637 HC RX REV CODE- 250/637: Performed by: PSYCHIATRY & NEUROLOGY

## 2021-05-29 RX ORDER — THERA TABS 400 MCG
1 TAB ORAL DAILY
Status: DISCONTINUED | OUTPATIENT
Start: 2021-05-30 | End: 2021-06-03 | Stop reason: HOSPADM

## 2021-05-29 RX ADMIN — ACETAMINOPHEN 650 MG: 325 TABLET ORAL at 06:12

## 2021-05-29 RX ADMIN — PANTOPRAZOLE 40 MG: 40 TABLET, DELAYED RELEASE ORAL at 16:30

## 2021-05-29 RX ADMIN — DICLOFENAC SODIUM 2 G: 10 GEL TOPICAL at 08:41

## 2021-05-29 RX ADMIN — DICLOFENAC SODIUM 2 G: 10 GEL TOPICAL at 21:42

## 2021-05-29 RX ADMIN — TRAZODONE HYDROCHLORIDE 50 MG: 50 TABLET ORAL at 21:37

## 2021-05-29 RX ADMIN — DULOXETINE HYDROCHLORIDE 20 MG: 20 CAPSULE, DELAYED RELEASE ORAL at 08:41

## 2021-05-29 RX ADMIN — PANTOPRAZOLE 40 MG: 40 TABLET, DELAYED RELEASE ORAL at 06:48

## 2021-05-29 RX ADMIN — DICLOFENAC SODIUM 2 G: 10 GEL TOPICAL at 13:59

## 2021-05-29 RX ADMIN — DICLOFENAC SODIUM 2 G: 10 GEL TOPICAL at 16:12

## 2021-05-29 NOTE — BH NOTES
Pt. is visible in the milieu. She ambulates using the walker. She interacts well with peer and staff. She denies SI/HI/AVH. She is compliant with her medications and stated that her arthritic pain is under control. Will continue to monitor for safety and provide support as needed.

## 2021-05-29 NOTE — CONSULTS
Nutrition Note    Pt reported tolerating diet with fair/good meal intake. Dislikes Ensure drinks; consuming magic cup. Agreeable to daily MVI, takes one at home. C/o constipation; no BM in a few days. Agreeable to daily prune juice. Attempted contacting MD regarding pt requesting bowel regimen due to c/o constipation; unsuccessful. Progressing towards nutrition goals      Nutrition Recommendations/Plan:   - Continue regular diet. Add prune juice daily  - Modify supplement: continue magic cup; discontinue Ensure Enlive.  Add Ensure Pudding BID  - Add daily MVI  - Recommend starting pt on bowel regimen       Electronically signed by Jackson Ferrara RD on 5/29/2021 at 6:18 PM    Contact: [phone]

## 2021-05-29 NOTE — PROGRESS NOTES
Problem: Suicide  Goal: *STG/LTG: Complies with medication therapy  Description: Takes medications as prescribed daily  Outcome: Progressing Towards Goal     Problem: Depressed Mood (Adult/Pediatric)  Goal: *STG: Demonstrates reduction in symptoms and increase in insight into coping skills/future focused  Description: Demonstrates reduction in symptoms and increase in insight into coping skills/future focused by discharge  Outcome: Progressing Towards Goal  Goal: *STG: Remains safe in hospital  Description: Remains free from falls and harm daily  Outcome: Progressing Towards Goal     Pt presents with dull affect, depressed mood, circumstantial thought process. Pt has been sociable with her peers and staff on the unit. Pt states, \"My hip is killing me, and I feel like I am constipated. I haven't had a good bowel movement in a few days. \" Hypoactive bowel sounds in all quadrants. No tenderness noted. Pt is being followed by Physical therapy, and was seen this morning. Pt is participative in groups and is adherent with unit guidelines. Pt denies SI/HI at this time. Pt is medication compliant. Will continue to monitor.

## 2021-05-29 NOTE — PROGRESS NOTES
Problem: Mobility Impaired (Adult and Pediatric)  Goal: *Acute Goals and Plan of Care (Insert Text)  Description: Physical Therapy Goals  Initiated 5/29/2021 and to be accomplished within 7 day(s)  1. Patient will move from supine to sit and sit to supine , scoot up and down, and roll side to side in bed with independence. 2.  Patient will transfer from bed to chair and chair to bed with modified independence using the least restrictive device. 3.  Patient will perform sit to stand with modified independence. 4.  Patient will ambulate with modified independence for 150 feet with the least restrictive device. 5.  Patient will ascend/descend 4 stairs with 1 handrail(s) with modified independence. PLOF: pt was mod ind with cane PTA, lives alone in a 1  with 3-4 KAYLAH, has local family for support      Outcome: Progressing Towards Goal     PHYSICAL THERAPY EVALUATION    Patient: Elijah Tucker (80 y.o. female)  Date: 5/29/2021  Primary Diagnosis: Depression [F32.9]        Precautions:      WBAT  PLOF: see above     ASSESSMENT :  Based on the objective data described below, the patient presents with generalized weakness and decreased activity tolerance however mobilizes at a supervision level. Pt received ambulating in unit with RW however was much too high for her and properly adjusted for correct fit. Pt has kyphotic posture 2/2 scoliosis at baseline as well as severe RLE OA. Pt would benefit from RW upon discharge to increase stability when ambulating as well as help with energy conservation and safety. Pt will be seen 1-2 times per week to maximize functional mobility and safety and to prevent further deconditioning from hospital stay. Patient will benefit from skilled intervention to address the above impairments.   Patient's rehabilitation potential is considered to be Good  Factors which may influence rehabilitation potential include:   []         None noted  [x]         Mental ability/status  [] Medical condition  [x]         Home/family situation and support systems  []         Safety awareness  []         Pain tolerance/management  []         Other:      PLAN :  Recommendations and Planned Interventions:   [x]           Bed Mobility Training             [x]    Neuromuscular Re-Education  [x]           Transfer Training                   []    Orthotic/Prosthetic Training  [x]           Gait Training                          []    Modalities  [x]           Therapeutic Exercises           []    Edema Management/Control  [x]           Therapeutic Activities            [x]    Family Training/Education  [x]           Patient Education  []           Other (comment):    Frequency/Duration: Patient will be followed by physical therapy 1-2 times per day/4-7 days per week to address goals. Discharge Recommendations: Home Health with family support  Further Equipment Recommendations for Discharge: rolling walker     SUBJECTIVE:   Patient stated I am doing pretty good.     OBJECTIVE DATA SUMMARY:     Past Medical History:   Diagnosis Date    Arthritis     Asthma     Cancer ([facility] [address]     hx of breast cancer    Disorder     scoliosis    Other ill-defined conditions(799.89)     osteoporosis,      Past Surgical History:   Procedure Laterality Date    BREAST SURGERY PROCEDURE UNLISTED      mastectomy - left    HX HEENT      cataract surgery with lens replacement     Barriers to Learning/Limitations: yes;  physical  Compensate with: Verbal Cues and Tactile Cues  Home Situation:  Home Situation  Home Environment: Private residence  # Steps to Enter: 4  One/Two Story Residence: One story  Living Alone: Yes  Support Systems: Family member(s)  Patient Expects to be Discharged to[de-identified] Private residence  Current DME Used/Available at Home: [address] [facility] [place]  Critical Behavior:  Neurologic State: Alert  Orientation Level: Oriented X4  Cognition: Follows commands  Safety/Judgement: Fall prevention  Psychosocial  Patient Behaviors: Calm;Cooperative                 Strength:    Strength: Within functional limits                    Tone & Sensation:   Tone: Normal              Sensation: Intact               Range Of Motion:  AROM: Within functional limits       Functional Mobility:  Bed Mobility:     Supine to Sit: Supervision  Sit to Supine: Supervision     Transfers:  Sit to Stand: Supervision  Stand to Sit: Supervision    Balance:   Sitting: Intact  Standing: Impaired  Standing - Static: Good  Standing - Dynamic : Fair    Ambulation/Gait Training:  Distance (ft): 75 Feet (ft)  Assistive Device: Walker, rolling  Ambulation - Level of Assistance: Supervision    Pain:  Pain level pre-treatment: not rated, RLE and LBP /10   Pain level post-treatment: \"   \" /10   Pain Intervention(s) : Medication (see MAR); Rest, Ice, Repositioning  Response to intervention: Nurse notified    Activity Tolerance:   Good    Please refer to the flowsheet for vital signs taken during this treatment. After treatment:   [x]         Patient left in no apparent distress sitting up in chair  []         Patient left in no apparent distress in bed  [x]         Call bell left within reach  [x]         Nursing notified  []         Caregiver present  []         Bed alarm activated  []         SCDs applied    COMMUNICATION/EDUCATION:   [x]         Role of Physical Therapy in the acute care setting. [x]         Fall prevention education was provided and the patient/caregiver indicated understanding. [x]         Patient/family have participated as able in goal setting and plan of care. [x]         Patient/family agree to work toward stated goals and plan of care. []         Patient understands intent and goals of therapy, but is neutral about his/her participation. []         Patient is unable to participate in goal setting/plan of care: ongoing with therapy staff.  []         Other:     Thank you for this referral.  Alicia Taylor   Time Calculation: 16 mins      Jasiel Complexity: History: MEDIUM  Complexity : 1-2 comorbidities / personal factors will impact the outcome/ POC Exam:MEDIUM Complexity : 3 Standardized tests and measures addressing body structure, function, activity limitation and / or participation in recreation  Presentation: MEDIUM Complexity : Evolving with changing characteristics  Clinical Decision Making:Medium Complexity    Overall Complexity:MEDIUM

## 2021-05-29 NOTE — GROUP NOTE
IP  GROUP DOCUMENTATION INDIVIDUAL Group Therapy Note Date: 5/29/2021 Group Start Time: 1800 Group End Time: 6793 Group Topic: Nursing [facility] 1 ADULT CHEM DEP Jorge Luis Ann RN 
 
[facility] GROUP DOCUMENTATION GROUP Group Therapy Note Attendees: 4 Attendance: Did not attend Joaquina Pack RN

## 2021-05-29 NOTE — BH NOTES
Pt. is visible in the milieu. She uses her walker for ambulation. She is cooperative with her medication and attends all her evening groups. She denies SI/HI/AVH. Pt. was transferred to room 122-1 for safety. Will continue to monitor for safety and provide support as needed.

## 2021-05-29 NOTE — PROGRESS NOTES
[address] [facility]     Physician Daily Progress Note    Patient:  Vipin Gary Age:  80 y.o. 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 Patient seen for follow-up. Admission H &P and interval history noted. Nursing notes and current meds reviewed. She states that her mood is getting better. Denies SI, HI today. She is currently on Duloxetine.       Current Medications:    Current Facility-Administered Medications   Medication Dose Route Frequency Provider Last Rate Last Admin    acetaminophen (TYLENOL) tablet 650 mg  650 mg Oral Q6H PRN Delores Hernandez MD   650 mg at 21 0612    hydrOXYzine pamoate (VISTARIL) capsule 25 mg  25 mg Oral Q6H PRN Delores Hernandez MD        traZODone (DESYREL) tablet 50 mg  50 mg Oral QHS PRN Delores Hernandez MD   50 mg at 21    DULoxetine (CYMBALTA) capsule 20 mg  20 mg Oral DAILY Delores Hernandez MD   20 mg at 21 0841    diclofenac (VOLTAREN) 1 % topical gel 2 g  2 g Topical QID Delores Hernandez MD   2 g at 21 1612    fentaNYL (DURAGESIC) 12 mcg/hr patch 1 Patch  1 Patch TransDERmal Q72H Delores Hernandez MD   1 Patch at 21 1543    pantoprazole (PROTONIX) tablet 40 mg  40 mg Oral ACB&D Delores Hernandez MD   40 mg at 21 1630    lidocaine 4 % patch 1 Patch  1 Patch TransDERmal Q24H Delores Hernandez MD   1 Patch at 21 0840         Compliant with medication:  Yes Side effects from medications:  No    Mental Status Exam      Appearance    General Behavior   Pleasant and cooperative     Speech form and content,  Language  Associations  Form of Thought   Normal flow and volume  TP : Logical, goal oriented   Mood, Affect  Self-Attitude  Vital Sense  SI/HI/PDW   Depressed  No SI, HI,  Endorses hopelessness   Abnormal Perceptions and illusions   Denies     Delusions   None   Anxiety    Denies   COGNITION Intelligence Abstraction   Intact   Judgement Insight   Limited       Medical:     Visit Vitals  /62 (BP 1 Location: Right arm, BP Patient Position: Sitting)   Pulse (!) 116   Temp 97.1 °F (36.2 °C)   Resp 22   Ht 5' 4\" (1.626 m)   SpO2 98%   BMI 17.97 kg/m²       No results found for this or any previous visit (from the past 24 hour(s)). Recommendation and Plan  Treatment Plan  1. Continue current treatment modalities? If no, state rationale and address changes in Treatment Plan under Optional Sections. Yes  2. Continue current medications? If no, state rationale and address changes in Medications under Optional Sections. Yes  3. Referrals or Consultations needed? No  4. Discharge Planning: Needs continues hospitalization for stabilization. I certify that this patient's inpatient psychiatric hospital services furnished since the previous certification were, and continue to be, required for treatment that could reasonably be expected to improve the patient's condition, or for diagnostic study, and that the patient continues to need, on a daily basis, active treatment furnished directly by or requiring the supervision of inpatient psychiatric facility personnel. In addition the hospital records show that services furnished were intensive treatment services, admission or related services, or equivalent services.        Signed By: Gilberto Juares MD     5/29/2021

## 2021-05-30 PROCEDURE — 74011000250 HC RX REV CODE- 250: Performed by: PSYCHIATRY & NEUROLOGY

## 2021-05-30 PROCEDURE — 99231 SBSQ HOSP IP/OBS SF/LOW 25: CPT | Performed by: PSYCHIATRY & NEUROLOGY

## 2021-05-30 PROCEDURE — 65220000003 HC RM SEMIPRIVATE PSYCH

## 2021-05-30 PROCEDURE — 74011250637 HC RX REV CODE- 250/637: Performed by: PSYCHIATRY & NEUROLOGY

## 2021-05-30 RX ORDER — DOCUSATE SODIUM 100 MG/1
100 CAPSULE, LIQUID FILLED ORAL DAILY
Status: DISCONTINUED | OUTPATIENT
Start: 2021-05-30 | End: 2021-06-03 | Stop reason: HOSPADM

## 2021-05-30 RX ORDER — MAG HYDROX/ALUMINUM HYD/SIMETH 200-200-20
15 SUSPENSION, ORAL (FINAL DOSE FORM) ORAL
Status: DISCONTINUED | OUTPATIENT
Start: 2021-05-30 | End: 2021-06-03 | Stop reason: HOSPADM

## 2021-05-30 RX ADMIN — ACETAMINOPHEN 650 MG: 325 TABLET ORAL at 20:52

## 2021-05-30 RX ADMIN — DICLOFENAC SODIUM 2 G: 10 GEL TOPICAL at 12:00

## 2021-05-30 RX ADMIN — ACETAMINOPHEN 650 MG: 325 TABLET ORAL at 05:52

## 2021-05-30 RX ADMIN — THERA TABS 1 TABLET: TAB at 08:39

## 2021-05-30 RX ADMIN — ACETAMINOPHEN 650 MG: 325 TABLET ORAL at 11:00

## 2021-05-30 RX ADMIN — DICLOFENAC SODIUM 2 G: 10 GEL TOPICAL at 08:39

## 2021-05-30 RX ADMIN — DULOXETINE HYDROCHLORIDE 20 MG: 20 CAPSULE, DELAYED RELEASE ORAL at 08:39

## 2021-05-30 RX ADMIN — DICLOFENAC SODIUM 2 G: 10 GEL TOPICAL at 22:22

## 2021-05-30 RX ADMIN — TRAZODONE HYDROCHLORIDE 50 MG: 50 TABLET ORAL at 20:52

## 2021-05-30 RX ADMIN — PANTOPRAZOLE 40 MG: 40 TABLET, DELAYED RELEASE ORAL at 06:44

## 2021-05-30 RX ADMIN — DOCUSATE SODIUM 100 MG: 100 CAPSULE, LIQUID FILLED ORAL at 09:23

## 2021-05-30 RX ADMIN — ALUMINUM HYDROXIDE, MAGNESIUM HYDROXIDE, AND SIMETHICONE 15 ML: 200; 200; 20 SUSPENSION ORAL at 16:27

## 2021-05-30 RX ADMIN — PANTOPRAZOLE 40 MG: 40 TABLET, DELAYED RELEASE ORAL at 16:27

## 2021-05-30 NOTE — BH NOTES
Patient given Tylenol for knee pain per patient request will continue to follow current POC and interventions per policies/interventions.

## 2021-05-30 NOTE — PROGRESS NOTES
[address] [facility]     Physician Daily Progress Note    Patient:  Yonathan Kelly Age:  80 y.o. :  10/23/1931     SEX:  female MRN:  [id number] [facility]:  [id number]    Admit Date:  2021     DSM 5 Diagnosis  Patient Active Problem List   Diagnosis Code    Complex renal cyst N28.1    Suicide attempt ([facility] T14.91XA    Acute metabolic encephalopathy I26.99    Severe protein-calorie malnutrition ([facility] E43    Major depressive disorder, single episode, severe without psychotic features [facility] F32.2         Subjective:   80year old  White female with recent h/o depression and OD in the context pf worsening pain and medical issues. Patient seen for follow-up. Admission H &P and interval history noted. Nursing notes and current meds reviewed. She states that her mood is getting better. Denies SI, HI today. She is currently on Duloxetine and states it helps with pain. She thinks that her depression was because of Tramadol. She denies any previous h/o psych treatment. She denies any depressive episodes except after the death of her mother 21 year sago.         Current Medications:    Current Facility-Administered Medications   Medication Dose Route Frequency Provider Last Rate Last Admin    therapeutic multivitamin (THERAGRAN) tablet 1 Tablet  1 Tablet Oral DAILY Jim Orr MD        acetaminophen (TYLENOL) tablet 650 mg  650 mg Oral Q6H PRN Christian Rock MD   650 mg at 21 8933    hydrOXYzine pamoate (VISTARIL) capsule 25 mg  25 mg Oral Q6H PRN Zaira Brown MD        traZODone (DESYREL) tablet 50 mg  50 mg Oral QHS PRN Zaira Brown MD   50 mg at 21 2137    DULoxetine (CYMBALTA) capsule 20 mg  20 mg Oral DAILY Zaira Brown MD   20 mg at 21 0841    diclofenac (VOLTAREN) 1 % topical gel 2 g  2 g Topical QID Zaira Brown MD   2 g at 21 2142    fentaNYL (DURAGESIC) 12 mcg/hr patch 1 Patch  1 Patch TransDERmal Q72H Saadia Arrieta MD   1 Patch at 05/27/21 1543    pantoprazole (PROTONIX) tablet 40 mg  40 mg Oral ACB&D Saadia Arrieta MD   40 mg at 05/30/21 8381    lidocaine 4 % patch 1 Patch  1 Patch TransDERmal Q24H Saadia Arrieta MD   1 Patch at 05/29/21 0840         Compliant with medication:  Yes     Side effects from medications:  No    Mental Status Exam      Appearance    General Behavior   Pleasant and cooperative     Speech form and content,  Language  Associations  Form of Thought   Normal flow and volume  TP : Logical, goal oriented   Mood, Affect  Self-Attitude  Vital Sense  SI/HI/PDW   Depressed  No SI, HI,  Endorses hopelessness   Abnormal Perceptions and illusions   Denies     Delusions   None   Anxiety    Denies   COGNITION Intelligence Abstraction   Intact   Judgement Insight   Limited       Medical:     Visit Vitals  /62 (BP 1 Location: Right arm, BP Patient Position: Sitting)   Pulse (!) 116   Temp 97.1 °F (36.2 °C)   Resp 22   Ht 5' 4\" (1.626 m)   SpO2 98%   BMI 17.97 kg/m²       No results found for this or any previous visit (from the past 24 hour(s)). Recommendation and Plan  Treatment Plan  1. Continue current treatment modalities? If no, state rationale and address changes in Treatment Plan under Optional Sections. Yes  2. Continue current medications? If no, state rationale and address changes in Medications under Optional Sections. Yes  3. Referrals or Consultations needed? No  4. Discharge Planning: Needs continues hospitalization for stabilization.      I certify that this patient's inpatient psychiatric hospital services furnished since the previous certification were, and continue to be, required for treatment that could reasonably be expected to improve the patient's condition, or for diagnostic study, and that the patient continues to need, on a daily basis, active treatment furnished directly by or requiring the supervision of inpatient psychiatric facility personnel. In addition the hospital records show that services furnished were intensive treatment services, admission or related services, or equivalent services.        Signed By: Priya Mcclendon MD     2021

## 2021-05-30 NOTE — BH NOTES
Pt has been drawing in the day area for much of the evening. Pt did take a short nap after dinner. Pt ate 75% of her dinner. Pt received Fentanyl patch to her Left arm this evening. Pt is calm and cooperative, interacting well with her peers. Pt denies SI/HI at this time. Pt is medication compliant, and received PRN Mylanta for c/o indigestion. Will continue to monitor.

## 2021-05-30 NOTE — PROGRESS NOTES
Problem: Suicide  Goal: *STG: Attends activities and groups  Description: Attends 2 groups minimum per day  Outcome: Progressing Towards Goal  Goal: *STG/LTG: Complies with medication therapy  Description: Takes medications as prescribed daily  Outcome: Progressing Towards Goal     Problem: Depressed Mood (Adult/Pediatric)  Goal: *STG: Remains safe in hospital  Description: Remains free from falls and harm daily  Outcome: Progressing Towards Goal     Pt presents with bright affect, depressed mood, circumstantial thought process, anxious at times. Pt has been sociable with her peers and staff on the unit. Pt states, \"My bowels are kind of backed up. I'm used to doing work and stuff around the yard. I think that it helps move things along. \" Pt reports having a small bowel movement today. Pt is participative in groups and is adherent with unit guidelines. Pt denies SI/HI at this time. Pt is medication compliant, and received PRN Tylenol this morning. Will continue to monitor.

## 2021-05-31 LAB
ANION GAP SERPL CALC-SCNC: 4 MMOL/L (ref 3–18)
BUN SERPL-MCNC: 13 MG/DL (ref 7–18)
BUN/CREAT SERPL: 13 (ref 12–20)
CALCIUM SERPL-MCNC: 8.8 MG/DL (ref 8.5–10.1)
CHLORIDE SERPL-SCNC: 103 MMOL/L (ref 100–111)
CO2 SERPL-SCNC: 27 MMOL/L (ref 21–32)
CREAT SERPL-MCNC: 0.99 MG/DL (ref 0.6–1.3)
GLUCOSE SERPL-MCNC: 115 MG/DL (ref 74–99)
POTASSIUM SERPL-SCNC: 4.7 MMOL/L (ref 3.5–5.5)
SODIUM SERPL-SCNC: 134 MMOL/L (ref 136–145)

## 2021-05-31 PROCEDURE — 74011000250 HC RX REV CODE- 250: Performed by: PSYCHIATRY & NEUROLOGY

## 2021-05-31 PROCEDURE — 65220000003 HC RM SEMIPRIVATE PSYCH

## 2021-05-31 PROCEDURE — 74011250637 HC RX REV CODE- 250/637: Performed by: PSYCHIATRY & NEUROLOGY

## 2021-05-31 PROCEDURE — 99231 SBSQ HOSP IP/OBS SF/LOW 25: CPT | Performed by: PSYCHIATRY & NEUROLOGY

## 2021-05-31 PROCEDURE — 80048 BASIC METABOLIC PNL TOTAL CA: CPT

## 2021-05-31 PROCEDURE — 36415 COLL VENOUS BLD VENIPUNCTURE: CPT

## 2021-05-31 RX ADMIN — DICLOFENAC SODIUM 2 G: 10 GEL TOPICAL at 12:12

## 2021-05-31 RX ADMIN — DOCUSATE SODIUM 100 MG: 100 CAPSULE, LIQUID FILLED ORAL at 09:14

## 2021-05-31 RX ADMIN — DULOXETINE HYDROCHLORIDE 20 MG: 20 CAPSULE, DELAYED RELEASE ORAL at 09:13

## 2021-05-31 RX ADMIN — DICLOFENAC SODIUM 2 G: 10 GEL TOPICAL at 20:06

## 2021-05-31 RX ADMIN — PANTOPRAZOLE 40 MG: 40 TABLET, DELAYED RELEASE ORAL at 17:06

## 2021-05-31 RX ADMIN — ACETAMINOPHEN 650 MG: 325 TABLET ORAL at 06:05

## 2021-05-31 RX ADMIN — ALUMINUM HYDROXIDE, MAGNESIUM HYDROXIDE, AND SIMETHICONE 15 ML: 200; 200; 20 SUSPENSION ORAL at 19:16

## 2021-05-31 RX ADMIN — ACETAMINOPHEN 650 MG: 325 TABLET ORAL at 21:30

## 2021-05-31 RX ADMIN — PANTOPRAZOLE 40 MG: 40 TABLET, DELAYED RELEASE ORAL at 06:33

## 2021-05-31 RX ADMIN — THERA TABS 1 TABLET: TAB at 09:14

## 2021-05-31 NOTE — PROGRESS NOTES
Problem: Suicide  Goal: *STG: Attends activities and groups  Description: Attends 2 groups minimum per day  Outcome: Progressing Towards Goal     Problem: Falls - Risk of  Goal: *Absence of Falls  Description: Document Lear Shaker Fall Risk and appropriate interventions in the flowsheet. Outcome: Progressing Towards Goal  Note: Fall Risk Interventions:  Mobility Interventions: Communicate number of staff needed for ambulation/transfer  Medication Interventions: Assess postural VS orthostatic hypotension  Elimination Interventions: Patient to call for help with toileting needs    Patient has been in day area most of day shift, has attended all groups and activities. Patient has been active participant in all groups and has interacted appropriate with staff and peers. Patient has eaten all meals and has taken scheduled medications. Patient has not received PRN medications this shift. Patient compliant with using walker while ambulating and has declined assistance with ADL's. Patient compliant with unit guidelines, free from falls and harm. Will continue to monitor and provide interventions as appropriate.

## 2021-05-31 NOTE — BH NOTES
Patient given Trazodone for insomnia and Tylenol for right leg pain per patient request will continue to follow current POC and interventions per policies/protocols.

## 2021-05-31 NOTE — GROUP NOTE
[facility]  GROUP DOCUMENTATION INDIVIDUAL Group Therapy Note Date: 5/31/2021 Group Start Time: 1704 Group End Time: 1413 Group Topic: Nursing [facility] 1 ADULT CHEM DEP Miryam Giron, RN 
 
[facility] GROUP DOCUMENTATION GROUP Group Therapy Note: Patient's were each given \"Packet for Success\"   Patients received starter packet of Self-Care activities which included List of Positive Affirmations, coloring cards to write affirmations on, as well as check-lists for mental, physical and emotional self care activities. Attendees: 8 Attendance: Attended Patient's Goal:  Create packet of success for self-care activities upon discharge Interventions/techniques: Informed, Reinforced and Supported Follows Directions: Followed directions Interactions: Interacted appropriately Mental Status: Calm and Congruent Behavior/appearance: Attentive and Cooperative Goals Achieved: Able to engage in interactions, Discussed coping, Discussed safety plan and Identified resources and support systems Additional Notes:  Patient listed \"My life has a purpose\" as one of her new daily affirmations. Adonay Watson

## 2021-05-31 NOTE — BH NOTES
Patient given Tylenol for pain per patient request will continue to follow current POC and interventions per policies/protocols.

## 2021-05-31 NOTE — PROGRESS NOTES
Nutrition Assessment     Type and Reason for Visit: Reassess    Nutrition Recommendations/Plan:   -Continue Regular diet as tolerated and encourage PO intake >/=75% of meals  -Discontinue Ensure and magic cups  -Continue to monitor PO intakes, bowel movements, and wt trends     Nutrition Assessment:  Pt reports eating most of her meals and never was a big eater pta. Pt dislikes Ensure supplements and magic cups and states she is given too much food. Pt states following a Renal diet at home and had recent bowel movement today and yesterday. Will discontinue sending supplements at this time, pt giving them away to other pts. Unable to assess wt changes, no recent documentation on weight. Malnutrition Assessment:  Malnutrition Status: Severe malnutrition     Estimated Daily Nutrient Needs:  Energy (kcal):  4931-1448  Protein (g):  58-72       Fluid (ml/day):  7287-1443    Nutrition Related Findings:  None      Current Nutrition Therapies:  DIET NUTRITIONAL SUPPLEMENTS Lunch, Dinner; Magic Cups  DIET NUTRITIONAL SUPPLEMENTS Breakfast, Dinner; Ensure Pudding  DIET REGULAR    Anthropometric Measures:  · Height:  5' 4\" (162.6 cm)  · Current Body Wt:  47.5 kg (104 lb 11.5 oz)  · BMI: 18    Nutrition Diagnosis:   · Severe malnutrition, In context of chronic illness related to early satiety, psychological cause or life stress (advanced age) as evidenced by severe loss of subcutaneous fat, severe muscle loss      Nutrition Intervention:  Food and/or Nutrient Delivery: Continue current diet, Vitamin supplement, Modify oral nutrition supplement  Nutrition Education and Counseling: Education not indicated  Coordination of Nutrition Care: Continue to monitor while inpatient (attempted discussing pt with MD)    Goals:  PO nutrition intake will continue to meet >75% of patients estimated nutritional needs over the next 7 days.        Nutrition Monitoring and Evaluation:   Behavioral-Environmental Outcomes: None identified  Food/Nutrient Intake Outcomes: Food and nutrient intake  Physical Signs/Symptoms Outcomes: None identified    Discharge Planning:    Continue current diet     Electronically signed by Anna Tejada RD on 5/31/2021 at 11:07 AM    Contact Number: [phone]

## 2021-05-31 NOTE — PROGRESS NOTES
[address] [facility]     Physician Daily Progress Note    Patient:  Abrahan Brown Age:  80 y.o. :  10/23/1931     SEX:  female MRN:  [id number] CSN:  [id number]    Admit Date:  2021     DSM 5 Diagnosis  Patient Active Problem List   Diagnosis Code    Complex renal cyst N28.1    Suicide attempt ([facility] T14.91XA    Acute metabolic encephalopathy G58.81    Severe protein-calorie malnutrition ([facility] E43    Major depressive disorder, single episode, severe without psychotic features ([facility] F32.2         Subjective:   80year old  White female with recent h/o depression and OD in the context pf worsening pain and medical issues. Patient seen for follow-up. She reports much better mood. Denies SI, HI today. She is currently on Duloxetine and states it helps with pain. She thinks that her depression was because of Tramadol. She denies any previous h/o psych treatment. She lives alone with her cat. States she has good support from her family especially her grandson. She still drives around. Her  passed away 8 years ago. She denies any depressive episodes except after the death of her mother 21 year sago.         Current Medications:    Current Facility-Administered Medications   Medication Dose Route Frequency Provider Last Rate Last Admin    docusate sodium (COLACE) capsule 100 mg  100 mg Oral DAILY Jim Miguel MD   100 mg at 21 0914    alum-mag hydroxide-simeth (MYLANTA) oral suspension 15 mL  15 mL Oral Q6H PRN Jim Miguel MD   15 mL at 21 1627    therapeutic multivitamin (THERAGRAN) tablet 1 Tablet  1 Tablet Oral DAILY Michelle Cifuentes MD   1 Tablet at 21 0914    acetaminophen (TYLENOL) tablet 650 mg  650 mg Oral Q6H PRN Deirdre Alvarenga MD   650 mg at 21 0605    hydrOXYzine pamoate (VISTARIL) capsule 25 mg  25 mg Oral Q6H PRN Deirdre Alvarenga MD        traZODone (DESYREL) tablet 50 mg  50 mg Oral QHS PRN Conner Chery MD   50 mg at 05/30/21 2052    DULoxetine (CYMBALTA) capsule 20 mg  20 mg Oral DAILY Conner Chery MD   20 mg at 05/31/21 1104    diclofenac (VOLTAREN) 1 % topical gel 2 g  2 g Topical QID Conner Chery MD   2 g at 05/31/21 1212    fentaNYL (DURAGESIC) 12 mcg/hr patch 1 Patch  1 Patch TransDERmal Q72H Conner Chery MD   1 Patch at 05/30/21 1623    pantoprazole (PROTONIX) tablet 40 mg  40 mg Oral ACB&D Conner Chery MD   40 mg at 05/31/21 3221    lidocaine 4 % patch 1 Patch  1 Patch TransDERmal Q24H Conner Chery MD   1 Patch at 05/31/21 0914         Compliant with medication:  Yes     Side effects from medications:  No    Mental Status Exam      Appearance    General Behavior   Pleasant and cooperative     Speech form and content,  Language  Associations  Form of Thought   Normal flow and volume  TP : Logical, goal oriented   Mood, Affect  Self-Attitude  Vital Sense  SI/HI/PDW   Depressed  No SI, HI,  Endorses hopelessness   Abnormal Perceptions and illusions   Denies     Delusions   None   Anxiety    Denies   COGNITION Intelligence Abstraction   Intact   Judgement Insight   Limited       Medical:     Visit Vitals  /65   Pulse 92   Temp 97.6 °F (36.4 °C)   Resp 18   Ht 5' 4\" (1.626 m)   SpO2 98%   BMI 17.97 kg/m²       Recent Results (from the past 24 hour(s))   METABOLIC PANEL, BASIC    Collection Time: 05/31/21  7:52 AM   Result Value Ref Range    Sodium 134 (L) 136 - 145 mmol/L    Potassium 4.7 3.5 - 5.5 mmol/L    Chloride 103 100 - 111 mmol/L    CO2 27 21 - 32 mmol/L    Anion gap 4 3.0 - 18 mmol/L    Glucose 115 (H) 74 - 99 mg/dL    BUN 13 7.0 - 18 MG/DL    Creatinine 0.99 0.6 - 1.3 MG/DL    BUN/Creatinine ratio 13 12 - 20      GFR est AA >60 >60 ml/min/1.73m2    GFR est non-AA 53 (L) >60 ml/min/1.73m2    Calcium 8.8 8.5 - 10.1 MG/DL        Recommendation and Plan  Treatment Plan  1. Continue current treatment modalities?  If no, state rationale and address changes in Treatment Plan under Optional Sections. Yes  2. Continue current medications? If no, state rationale and address changes in Medications under Optional Sections. Yes  3. Referrals or Consultations needed? No  4. Discharge Planning: Needs continues hospitalization for stabilization. I certify that this patient's inpatient psychiatric hospital services furnished since the previous certification were, and continue to be, required for treatment that could reasonably be expected to improve the patient's condition, or for diagnostic study, and that the patient continues to need, on a daily basis, active treatment furnished directly by or requiring the supervision of inpatient psychiatric facility personnel. In addition the hospital records show that services furnished were intensive treatment services, admission or related services, or equivalent services.        Signed By: Edis Quezada MD     5/31/2021

## 2021-05-31 NOTE — GROUP NOTE
IP  GROUP DOCUMENTATION INDIVIDUAL Group Therapy Note Date: 5/30/2021 Group Start Time: 2215 Group End Time: 1441 Group Topic: Medication [facility] 1 ADULT CHEM DEP Venita Munguia RN 
 
[facility] GROUP DOCUMENTATION GROUP Group Therapy Note Attendees: 4 Attendance: Did not attend Additional Notes:  Patient asleep Yandel Alanis RN

## 2021-06-01 PROCEDURE — 99231 SBSQ HOSP IP/OBS SF/LOW 25: CPT | Performed by: PSYCHIATRY & NEUROLOGY

## 2021-06-01 PROCEDURE — 65220000003 HC RM SEMIPRIVATE PSYCH

## 2021-06-01 PROCEDURE — 74011250637 HC RX REV CODE- 250/637: Performed by: PSYCHIATRY & NEUROLOGY

## 2021-06-01 PROCEDURE — 97116 GAIT TRAINING THERAPY: CPT

## 2021-06-01 PROCEDURE — 74011000250 HC RX REV CODE- 250: Performed by: PSYCHIATRY & NEUROLOGY

## 2021-06-01 RX ADMIN — PANTOPRAZOLE 40 MG: 40 TABLET, DELAYED RELEASE ORAL at 15:31

## 2021-06-01 RX ADMIN — ACETAMINOPHEN 650 MG: 325 TABLET ORAL at 07:34

## 2021-06-01 RX ADMIN — THERA TABS 1 TABLET: TAB at 08:43

## 2021-06-01 RX ADMIN — ALUMINUM HYDROXIDE, MAGNESIUM HYDROXIDE, AND SIMETHICONE 15 ML: 200; 200; 20 SUSPENSION ORAL at 18:00

## 2021-06-01 RX ADMIN — DICLOFENAC SODIUM 2 G: 10 GEL TOPICAL at 20:55

## 2021-06-01 RX ADMIN — DULOXETINE HYDROCHLORIDE 20 MG: 20 CAPSULE, DELAYED RELEASE ORAL at 08:43

## 2021-06-01 RX ADMIN — DICLOFENAC SODIUM 2 G: 10 GEL TOPICAL at 08:49

## 2021-06-01 RX ADMIN — TRAZODONE HYDROCHLORIDE 50 MG: 50 TABLET ORAL at 20:50

## 2021-06-01 RX ADMIN — DOCUSATE SODIUM 100 MG: 100 CAPSULE, LIQUID FILLED ORAL at 08:43

## 2021-06-01 RX ADMIN — DICLOFENAC SODIUM 2 G: 10 GEL TOPICAL at 12:27

## 2021-06-01 RX ADMIN — PANTOPRAZOLE 40 MG: 40 TABLET, DELAYED RELEASE ORAL at 07:34

## 2021-06-01 RX ADMIN — DICLOFENAC SODIUM 2 G: 10 GEL TOPICAL at 16:00

## 2021-06-01 RX ADMIN — ACETAMINOPHEN 650 MG: 325 TABLET ORAL at 20:50

## 2021-06-01 NOTE — PROGRESS NOTES
Problem: Suicide  Goal: *STG: Attends activities and groups  Description: Attends 2 groups minimum per day  Outcome: Progressing Towards Goal  Goal: *STG/LTG: Complies with medication therapy  Description: Takes medications as prescribed daily  Outcome: Progressing Towards Goal     Problem: Depressed Mood (Adult/Pediatric)  Goal: *STG: Remains safe in hospital  Description: Remains free from falls and harm daily  Outcome: Progressing Towards Goal     Malinda Aranda is meal and med compliant. She has attended groups today. Malinda Aranda remains fixated on hip pain despite reporting relief since starting new pain meds in hospital. She ambulates with a walker, and is free from falls and harm. Will continue to provide support as needed.

## 2021-06-01 NOTE — PROGRESS NOTES
Problem: Mobility Impaired (Adult and Pediatric)  Goal: *Acute Goals and Plan of Care (Insert Text)  Description: Physical Therapy Goals  Initiated 5/29/2021 and to be accomplished within 7 day(s)  1. Patient will move from supine to sit and sit to supine , scoot up and down, and roll side to side in bed with independence. 2.  Patient will transfer from bed to chair and chair to bed with modified independence using the least restrictive device. 3.  Patient will perform sit to stand with modified independence. 4.  Patient will ambulate with modified independence for 150 feet with the least restrictive device. 5.  Patient will ascend/descend 4 stairs with 1 handrail(s) with modified independence. PLOF: pt was mod ind with cane PTA, lives alone in a [address] [place] [address] with 3-4 KAYLAH, has local family for support      Outcome: Resolved/Met   PHYSICAL THERAPY TREATMENT AND DISCHARGE    Patient: Abrahan Brown (80 y.o. female)  Date: 6/1/2021  Diagnosis: Depression [F32.9] Major depressive disorder, single episode, severe without psychotic features ([facility] [address]       Precautions:  fall  PLOF: see above    ASSESSMENT:  Based on the objective data described below, the patient presents at mod I/independent level with ambulation and transfers using the RW. Pt reports that she has been walking up and down the hallway to build her endurance up. Educated patient on continuing to walk in the hallway to build strength and endurance while hospitalized. PLAN:  Maximum therapeutic gains met at current level of care and patient will be discharged from physical therapy at this time.   Rationale for discharge:  [x]     Goals Achieved-except stairs due to being in behavioral medicine  []     [address]  []     Patient not participating in therapy  []     Other:  Discharge Recommendations:  Home Health  Further Equipment Recommendations for Discharge:  rolling walker     SUBJECTIVE:   Patient stated I'm feeling much better.     OBJECTIVE DATA SUMMARY:   Critical Behavior:  Neurologic State: Alert  Orientation Level: Oriented X4  Cognition: Follows commands  Safety/Judgement: Fall prevention  Functional Mobility Training:  Bed Mobility:  Supine to Sit: Independent  Sit to Supine: Independent  Transfers:  Sit to Stand: Modified independent  Stand to Sit: Modified independent     Balance:  Sitting: Intact  Standing: With support  Standing - Static: Good  Standing - Dynamic :  (fair+)   Ambulation/Gait Training:  Distance (ft): 150 Feet (ft)  Assistive Device: Walker, rolling  Ambulation - Level of Assistance: Modified independent  Pain:  Pain level pre-treatment: 2/10   Pain level post-treatment: 2/10   Pain Intervention(s): ; Rest  Response to intervention: Nurse notified, See doc flow    Activity Tolerance:   good  Please refer to the flowsheet for vital signs taken during this treatment. After treatment:   [x] Patient left in no apparent distress sitting up in chair in day room  [] Patient left in no apparent distress in bed  [] Call bell left within reach  [x] Nursing notified  [] Caregiver present  [] Bed alarm activated  [] SCDs applied      COMMUNICATION/EDUCATION:   []         Role of Physical Therapy in the acute care setting. [x]         Fall prevention education was provided and the patient/caregiver indicated understanding. [x]         Patient/family have participated as able and agree with findings and recommendations. []         Patient is unable to participate in plan of care at this time.   []         Other:        Ivette Wen, PT   Time Calculation: 15 mins

## 2021-06-01 NOTE — ACP (ADVANCE CARE PLANNING)
Advance Care Planning   Advance Care Planning Inpatient Note  [address] [facility] Department    Today's Date: 6/1/2021  Unit: [facility] 1 ADULT CHEM DEP    Received request from admission screening. Upon review of chart and communication with care team, Spiritual Care will defer Advance Care planning with patient at this time. Patient was/were present in the room during visit. Goals of ACP Conversation:  Discuss Advance Care planning documents    Health Care Decision Makers:      Click here to complete [address] including selection of the Healthcare Decision Maker Relationship (ie \"Primary\")     Today we:  Documented Next of Kin, per patient report     Advance Care Planning Documents (Patient Wishes) on file:  None     Assessment:     provided education on Advance Medical Care documents and planning. Patient was unable to complete documents at this time.  provided pastoral care assessment, support and offered prayer for patient and family.     Interventions:  Deferred conversation as patient not interested in completing an advance directive at this time     Outcomes/Plan:  ACP Discussion Postposed     Electronically signed by Chaplain Claudio on 6/1/2021 at 2:40 PM

## 2021-06-01 NOTE — PROGRESS NOTES
AMD - Not Interested   addressed Advance Medical Directives with the patient. Patient is not interested at this time. Chaplains will continue to follow and will provide pastoral care as needed or requested. Violet Brady MDiv.   Cindy Duncan   [phone]

## 2021-06-01 NOTE — BSMART NOTE
OCCUPATIONAL THERAPY PROGRESS NOTE Group Time:  5100 Attendance: The patient attended full group. Participation: The patient participated fully in the activity. Michelle Moy Attention: The patient was able to focus on the activity. Interaction: The patient occasionally  interacts with others. Seems to have some difficulty abstract thinking, patient is rigid in some thoughts expressed with little ability to see how some small changes might be helpful. States \"I am not depressed\". Says the pain is a little better with more focus on not getting the medication she takes at home because the hospital doesn't \"carry it\".

## 2021-06-01 NOTE — GROUP NOTE
IP  GROUP DOCUMENTATION INDIVIDUAL Group Therapy Note Date: 6/1/2021 Group Start Time: 46 Group End Time: 0900 Group Topic: Medication [facility] 1 ADULT CHEM DEP Edwin Roman RN 
 
[facility] GROUP DOCUMENTATION GROUP Group Therapy Note Attendees: 10 Attendance: Attended Patient's Goal:  Medication compliance Interventions/techniques: Informed and Supported Follows Directions: Followed directions Interactions: Interacted appropriately Mental Status: Depressed Behavior/appearance: Cooperative Goals Achieved: Able to self-disclose Isabella Augustin RN

## 2021-06-01 NOTE — PROGRESS NOTES
[address] [facility] [address]  Inpatient Progress Note     Date of Service: 06/01/21  Hospital Day: 5     Subjective/Interval History   06/01/21    Treatment Team Notes:  Notes reviewed and/or discussed and report that Sirena Caldera is an 79-year-old female who was admitted after a drug overdose. No acute events overnight per nursing report. Patient is compliant with treatment plan. She continues to complain of pain in her hip. Patient interview: Sirena Caldera was interviewed by this writer today. We reviewed events leading to admission with patient stating that she was tired of dealing with chronic pain issues. She has been prescribed Tylenol and other medication by her PCP which was not helping. Patient says she is feeling better but she still has severe pain in her right hip. She denies feeling depressed today. She says she is in a good mood. She denies suicidal ideation. She reports some difficulty initiating and maintaining sleep. Patient reports that she is very active and independent at home. She says she does yard work and takes care of her cat. She feels comfortable at home and her son and grandson live close by. Collateral will need to be obtained from a family member. Objective     Visit Vitals  /74   Pulse 92   Temp 97.6 °F (36.4 °C)   Resp 16   Ht 5' 4\" (1.626 m)   SpO2 98%   BMI 17.97 kg/m²       No results found for this or any previous visit (from the past 24 hour(s)). Mental Status Examination     Appearance/Hygiene 80 y.o.  WHITE female  Hygiene: good   Behavior/Social Relatedness Appropriate   Musculoskeletal Gait/Station: Steady with walker, stooped posture  Tone (flaccid, cogwheeling, spastic): not assessed  Psychomotor (hyperkinetic, hypokinetic): calm   Involuntary movements (tics, dyskinesias, akathisa, stereotypies): none   Speech   Rate, rhythm, volume, fluency and articulation are appropriate   Mood   euthymic   Affect    congruent   Thought Process Linear and goal directed   Thought Content and Perceptual Disturbances Denies self-injurious behavior (SIB), suicidal ideation (SI), aggressive behavior or homicidal ideation (HI)    Denies auditory and visual hallucinations   Sensorium and Cognition  Grossly intact   Insight  fair   Judgment  fair        Assessment/Plan      Psychiatric Diagnoses:   Patient Active Problem List   Diagnosis Code    Complex renal cyst N28.1    Suicide attempt ([facility] [address] T14.91XA    Acute metabolic encephalopathy G62.09    Severe protein-calorie malnutrition ([facility] [address] E43    Major depressive disorder, single episode, severe without psychotic features ([facility] [address] F32.2       Level of impairment/disability: Beverley Weathers is a 80 y.o. who is currently admitted after suicide attempt. 1.  Continue current treatment plan. 2.  Contact her son or daughter-in-law for collateral information. 3.  Disposition/Discharge Date: self-care/home, discharge planning.     MD DR. HERMELINDO [facility]  Psychiatry

## 2021-06-01 NOTE — BSMART NOTE
History: Bethel Wick is an 79-year-old female who was admitted after a drug overdose. No acute events overnight per nursing report. Patient is compliant with treatment plan. She continues to complain of pain in her hip but says it is better than prior to admission. Patient is observed isolated and engaged with certain peers. Patient reports she is feeling better but remains of pain in her hip. Patient reports no SI/HI. Patient denies no AVH. Patient is encouraged to continue with treatment to address feelings associated with chronic pain. SW will continue to monitor patient and will assist patient with discharge.  
 
JODY Yeager

## 2021-06-01 NOTE — BH NOTES
On 5/31/21 during the 3-11 pm shift, patient is polite and manner-able. Patient spends the majority of her time in her room reading. Patient ate all of her dinner with no complaints. Patient is medication compliant and follows rules of this facility. Patient also spent some time in the milieu coloring this evening. This writer will continue to monitor patient for safety.

## 2021-06-02 PROCEDURE — 74011250637 HC RX REV CODE- 250/637: Performed by: PSYCHIATRY & NEUROLOGY

## 2021-06-02 PROCEDURE — 99232 SBSQ HOSP IP/OBS MODERATE 35: CPT | Performed by: PSYCHIATRY & NEUROLOGY

## 2021-06-02 PROCEDURE — 74011000250 HC RX REV CODE- 250: Performed by: PSYCHIATRY & NEUROLOGY

## 2021-06-02 PROCEDURE — 65220000003 HC RM SEMIPRIVATE PSYCH

## 2021-06-02 RX ADMIN — PANTOPRAZOLE 40 MG: 40 TABLET, DELAYED RELEASE ORAL at 06:34

## 2021-06-02 RX ADMIN — PANTOPRAZOLE 40 MG: 40 TABLET, DELAYED RELEASE ORAL at 16:15

## 2021-06-02 RX ADMIN — DICLOFENAC SODIUM 2 G: 10 GEL TOPICAL at 16:00

## 2021-06-02 RX ADMIN — DICLOFENAC SODIUM 2 G: 10 GEL TOPICAL at 22:00

## 2021-06-02 RX ADMIN — ALUMINUM HYDROXIDE, MAGNESIUM HYDROXIDE, AND SIMETHICONE 15 ML: 200; 200; 20 SUSPENSION ORAL at 21:29

## 2021-06-02 RX ADMIN — ACETAMINOPHEN 650 MG: 325 TABLET ORAL at 21:28

## 2021-06-02 RX ADMIN — DULOXETINE HYDROCHLORIDE 20 MG: 20 CAPSULE, DELAYED RELEASE ORAL at 08:43

## 2021-06-02 RX ADMIN — DICLOFENAC SODIUM 2 G: 10 GEL TOPICAL at 12:00

## 2021-06-02 RX ADMIN — DICLOFENAC SODIUM 2 G: 10 GEL TOPICAL at 08:43

## 2021-06-02 RX ADMIN — TRAZODONE HYDROCHLORIDE 50 MG: 50 TABLET ORAL at 21:28

## 2021-06-02 RX ADMIN — THERA TABS 1 TABLET: TAB at 08:43

## 2021-06-02 RX ADMIN — DOCUSATE SODIUM 100 MG: 100 CAPSULE, LIQUID FILLED ORAL at 08:43

## 2021-06-02 NOTE — GROUP NOTE
IP  GROUP DOCUMENTATION INDIVIDUAL Group Therapy Note Date: 6/2/2021 Group Start Time: 46 Group End Time: 0900 Group Topic: Medication [facility] 1 ADULT CHEM DEP Kirk Blue RN 
 
[facility] GROUP DOCUMENTATION GROUP Group Therapy Note Attendees: 11 Attendance: Attended Patient's Goal:  Medication compliance Interventions/techniques: Informed and Supported Follows Directions: Followed directions Interactions: Interacted appropriately Mental Status: Calm Behavior/appearance: Cooperative Goals Achieved: Able to self-disclose Jeanice Epley, RN

## 2021-06-02 NOTE — BSMART NOTE
OCCUPATIONAL THERAPY PROGRESS NOTE Group Time:  2219 Attendance: The patient attended full group. Participation: The patient participated with moderate elaboration in the activity. Attention: The patient was able to focus on the activity. Interaction: The patient frequently interacts with others. Mood improving and brighter.

## 2021-06-02 NOTE — BSMART NOTE
History: Oly Preston an 63-year-old female who was admitted after a drug overdose.  No acute events overnight per nursing report. Miriam Tinoco is compliant with treatment plan.  She continues to complain of pain in her hip but says it is better than prior to admission. SW made contact with patient as she engaged with peers in the milieu. Patients affect is brighter. Patient continues to address pain in her hip. Patient denies SI/HI. Patient denies AVH. Patient reports she is prepared for discharge and is able to care for herself. SW will contact family to address needs at discharge. SW will continue to monitor and will assist as needed.  
 
Anand Almodovar, JODY

## 2021-06-02 NOTE — BSMART NOTE
ART THERAPY GROUP PROGRESS NOTE PATIENT SCHEDULED FOR GROUP AT: 1300 ATTENDANCE: Full PARTICIPATION LEVEL: Participates fully in the art process ATTENTION LEVEL : Able to focus on task FOCUS: Grounding SYMBOLIC & THEMATIC CONTENT AS NOTED IN IMAGERY: She was calm, compliant, and alert. She was invested in the task at hand and her approach to task was planned-out and purposeful.  She participated in group discussion, associations relevant and general.

## 2021-06-02 NOTE — BH NOTES
Patient spend the evening in the milieu interacting with peers and doing some art work. Patient had snack and medication and retired to her room early. Staff will continue to monitor patient for safety.

## 2021-06-02 NOTE — PROGRESS NOTES
Problem: Suicide  Goal: *STG: Attends activities and groups  Description: Attends 2 groups minimum per day  Outcome: Progressing Towards Goal  Goal: *STG/LTG: Complies with medication therapy  Description: Takes medications as prescribed daily  Outcome: Progressing Towards Goal     Problem: Depressed Mood (Adult/Pediatric)  Goal: *STG: Demonstrates reduction in symptoms and increase in insight into coping skills/future focused  Description: Demonstrates reduction in symptoms and increase in insight into coping skills/future focused by discharge  Outcome: Progressing Towards Goal   Benton Gonzalez is meal and med compliant. She attends groups and interacts with her peers in the dayroom. She ambulates with a walker for safety, and she is free from falls and harm. Today Benton Gonzalez reports that her R hip pain is consistently a 5, despite medication, but it is better than prior to her admission. She stated \" I am trying not to focus on the pain so much now that it is better\". She reports a lot of relief from topical Voltaren and would like to continue using it after discharge. Will continue to provide support as needed.

## 2021-06-02 NOTE — GROUP NOTE
IP  GROUP DOCUMENTATION INDIVIDUAL Group Therapy Note Date: 6/1/2021 Group Start Time: 5846 Group End Time: 1815 Group Topic: Nursing [facility] 1 ADULT CHEM DEP Jenita Nissen., RN 
 
IP  GROUP DOCUMENTATION GROUP Group Therapy Note: Importance of physical recreation on mental health Attendees: 9 Attendance: Did not attend Additional Notes: Patient declined group despite staff encouragement Kristeen Cooks Natalia Gentle

## 2021-06-02 NOTE — PROGRESS NOTES
[address] [facility] [address]  Inpatient Progress Note     Date of Service: 06/02/21  Hospital Day: 6     Subjective/Interval History   06/02/21    Treatment Team Notes:  Notes reviewed and/or discussed and report that Lynnette Weathers is an 80-year-old female who was admitted after a drug overdose. No acute events overnight per nursing report. Patient is compliant with treatment plan. She continues to complain of pain in her hip but says it is better than prior to admission. Patient interview: Lynnette Weathers was interviewed by this writer today. The patient reports euthymic mood and says she is feeling fine in terms of her mood. She denies suicidal ideation. She denies feeling depressed, sad or down. She says she is eager for discharge. She describes an active and independent routine at home. She reports good sleep with good energy level and appetite. She rates her pain as 4 or 5 on a scale of 1-10 with 10 being the most severe pain. Her son and daughter-in-law were contacted for collateral information. They are currently at her house and will be living with her temporarily. They are very anxious for her to be discharged home and they said they will manage her medications. Her daughter-in-law will be keeping her medications and dispensing to her as prescribed. She will also get a follow-up appointment with her primary care physician as soon as possible. Objective     Visit Vitals  /76 (BP 1 Location: Right arm, BP Patient Position: Sitting)   Pulse 100   Temp 97.3 °F (36.3 °C)   Resp 20   Ht 5' 4\" (1.626 m)   SpO2 98%   BMI 17.97 kg/m²       No results found for this or any previous visit (from the past 24 hour(s)). Mental Status Examination     Appearance/Hygiene 80 y.o.  WHITE female  Hygiene: good   Behavior/Social Relatedness Appropriate   Musculoskeletal Gait/Station: Steady with walker, stooped posture  Tone (flaccid, cogwheeling, spastic): not assessed  Psychomotor (hyperkinetic, hypokinetic): calm   Involuntary movements (tics, dyskinesias, akathisa, stereotypies): none   Speech   Rate, rhythm, volume, fluency and articulation are appropriate   Mood   euthymic   Affect    congruent   Thought Process Linear and goal directed   Thought Content and Perceptual Disturbances Denies self-injurious behavior (SIB), suicidal ideation (SI), aggressive behavior or homicidal ideation (HI)    Denies auditory and visual hallucinations   Sensorium and Cognition  Grossly intact   Insight  fair   Judgment  fair        Assessment/Plan      Psychiatric Diagnoses:   Patient Active Problem List   Diagnosis Code    Complex renal cyst N28.1    Suicide attempt ([facility] [address] T14.91XA    Acute metabolic encephalopathy Y52.21    Severe protein-calorie malnutrition ([address] E43    Major depressive disorder, single episode, severe without psychotic features ([address] F32.2       Level of impairment/disability: Beverley Faust is a 80 y.o. who is currently admitted after suicide attempt. 1.  Continue current treatment plan. 3.  Disposition/Discharge Date: Discharge planning for tomorrow.     MD DR. HERMELINDO [facility]ier[facility]  Psychiatry

## 2021-06-02 NOTE — PROGRESS NOTES
Patient's temp elevated this am 100.7. Staff advised patient drinking coffee when taken.  Recheck temp 97.3

## 2021-06-03 VITALS
HEIGHT: 64 IN | DIASTOLIC BLOOD PRESSURE: 80 MMHG | BODY MASS INDEX: 17.42 KG/M2 | RESPIRATION RATE: 18 BRPM | SYSTOLIC BLOOD PRESSURE: 100 MMHG | OXYGEN SATURATION: 98 % | WEIGHT: 102 LBS | TEMPERATURE: 98.6 F | HEART RATE: 100 BPM

## 2021-06-03 PROCEDURE — 99239 HOSP IP/OBS DSCHRG MGMT >30: CPT | Performed by: PSYCHIATRY & NEUROLOGY

## 2021-06-03 PROCEDURE — 74011000250 HC RX REV CODE- 250: Performed by: PSYCHIATRY & NEUROLOGY

## 2021-06-03 PROCEDURE — 74011250637 HC RX REV CODE- 250/637: Performed by: PSYCHIATRY & NEUROLOGY

## 2021-06-03 RX ADMIN — DULOXETINE HYDROCHLORIDE 20 MG: 20 CAPSULE, DELAYED RELEASE ORAL at 08:54

## 2021-06-03 RX ADMIN — ACETAMINOPHEN 650 MG: 325 TABLET ORAL at 05:10

## 2021-06-03 RX ADMIN — DOCUSATE SODIUM 100 MG: 100 CAPSULE, LIQUID FILLED ORAL at 08:55

## 2021-06-03 RX ADMIN — DICLOFENAC SODIUM 2 G: 10 GEL TOPICAL at 08:55

## 2021-06-03 RX ADMIN — THERA TABS 1 TABLET: TAB at 08:55

## 2021-06-03 RX ADMIN — PANTOPRAZOLE 40 MG: 40 TABLET, DELAYED RELEASE ORAL at 06:30

## 2021-06-03 RX ADMIN — DICLOFENAC SODIUM 2 G: 10 GEL TOPICAL at 12:46

## 2021-06-03 NOTE — BH NOTES
Patient given Tylenol for Right hip pain  per patient request will continue to follow current POC and interventions per policies/protcols.

## 2021-06-03 NOTE — SUICIDE SAFETY PLAN
SAFETY PLAN    A suicide Safety Plan is a document that supports someone when they are having thoughts of suicide. Warning Signs that indicate a suicidal crisis may be developing: What (situations, thoughts, feelings, body sensations, behaviors, etc.) do you experience that lets you know you are beginning to think about suicide? 1. Pain (arthritis)    Internal Coping Strategies:  What things can I do (relaxation techniques, hobbies, physical activities, etc.) to take my mind off my problems without contacting another person? 1. Work in the yard. People and social settings that provide distraction: Who can I call or where can I go to distract me? 1. Name: Luh Hanson  Phone: [phone]  2. Name: Kings Cummings  Phone: [phone]     People whom I can ask for help: Who can I call when I need help - for example, friends, family, clergy, someone else? 1. Name: Luh Hanson                Phone: [phone]  2. Name: Kings Cummings  Phone: [phone]    Professionals or [address] [place], [place] [address]vd I can contact during a crisis: Who can I call for help - for example, my doctor, my psychiatrist, my psychologist, a mental health provider, a suicide hotline? 3. Suicide Prevention Lifeline: 0-943-490-TALK (9483)    4. [address] [place] [address] Emergency Services -  for example, [facility] suicide hotline, [facility] Hotline: [place]    Making the environment safe: How can I make my environment (house/apartment/living space) safer? For example, can I remove guns, medications, and other items? 1. Put rails on the front porch. 2. Get new bricks for porch.

## 2021-06-03 NOTE — BSMART NOTE
ART THERAPY GROUP PROGRESS NOTE PATIENT SCHEDULED FOR GROUP AT: 1000 ATTENDANCE: Full PARTICIPATION LEVEL: Participates fully in the art process. ATTENTION LEVEL : Able to focus on task. FOCUS: Supports and Safety SYMBOLIC & THEMATIC CONTENT AS NOTED IN IMAGERY: Patient presented as calm and was pleasant. She was focused and invested in the task at hand. Her approach was organized. She asked for assistance when she was unable to hear or needed further instruction. When asked a discussion prompt she was able to offer new ideas for the group. She identified ways to ensure her physical safety; new railing, remodeled bathtub, or life alert button. She also identified other supports such as [employer], groups, and pets. None

## 2021-06-03 NOTE — GROUP NOTE
GARRY  GROUP DOCUMENTATION INDIVIDUAL Group Therapy Note Date: 6/2/2021 Group Start Time: 2000 Group End Time: 2030 Group Topic: Medication [facility] 1 ADULT CHEM DEP Torres Zaragoza [facility] GROUP DOCUMENTATION GROUP Group Therapy Note Attendees: 8 Attendance: Attended Interventions/techniques: Informed Follows Directions: Followed directions Interactions: Interacted appropriately Mental Status: Calm Behavior/appearance: Attentive Goals Achieved: Able to listen to others Lizzy Lee

## 2021-06-03 NOTE — BSMART NOTE
SOCIAL WORK GROUP THERAPY PROGRESS NOTE Group Time:  10:45am  
 
Group Topic:  Coping Skills    C D Issues Group Participation:   
 
Pt moderately involved during group discussion but remained attentive. Did handout on  x25 ways to be better in managing \"anxiety\". & \"depression\". Differences between Assertive, Aggressive & Non-Assertive Behaviors. Admitted struggles some with leisure activities & having fun. Listened to peer & writer suggestions how to manage better.

## 2021-06-03 NOTE — BH NOTES
Patient given Trazodone for insomnia, Tylenol for Right hip pain, and Mylanta for indigestion  per patient request will continue to follow current POC and interventions per policies/protcols.

## 2021-06-03 NOTE — PROGRESS NOTES
Discharge Note                Upon discharge, patient presented as Stable and denies Anxious and Depressed. Patient received discharge instructions and verbalized understanding. Writer also offered patient the flu immunization and education, to which the patient said Not Flu season and so the flu immunization was not administered. All patient belongings have been returned:  Dental Appliance:    Vision:    Hearing Aid:    Jewelry: Jewelry: None  Clothing: Clothing: Bathrobe, Footwear, Slippers, With patient  Other Valuables: Other Valuables: None  Valuables sent to safe: Personal Items Sent to Safe:  (none)  Patient armband removed and shredded, and was escorted out of facility, ambulatory, with staff.

## 2021-06-03 NOTE — PROGRESS NOTES
Nutrition Assessment     Type and Reason for Visit: Reassess    Nutrition Recommendations/Plan:  - Add supplements: Ensure Pudding, once daily.  - Monitor and encourage po intake as tolerated. - Wt updated today, continue bowel regimen. Nutrition Assessment:  Pt reports good po intake & reports she was previously consuming Ensure pudding supplements, likes the chocolate. Pt with plan for discharge today, wt obtained today using standing scale, 102#. Denies diet intolerance but does c/o food being too heavy. Malnutrition Assessment:  Malnutrition Status: Severe malnutrition     Estimated Daily Nutrient Needs:  Energy (kcal):  7299-1937  Protein (g):  58-72       Fluid (ml/day):  2521-0665    Nutrition Related Findings:  Intermittent constipation, colace daily. Mylanta for indigestion, heartburn. MVI      Current Nutrition Therapies:  DIET REGULAR    Anthropometric Measures:  · Height:  5' 4\" (162.6 cm)  · Current Body Wt:  47.5 kg (104 lb 11.5 oz)  · BMI: 18    Nutrition Diagnosis:   · Severe malnutrition, In context of chronic illness related to early satiety, psychological cause or life stress (advanced age) as evidenced by severe loss of subcutaneous fat, severe muscle loss    Nutrition Intervention:  Food and/or Nutrient Delivery: Continue current diet, Start oral nutrition supplement, Vitamin supplement, Mineral supplement  Nutrition Education and Counseling: Education not indicated  Coordination of Nutrition Care: Continue to monitor while inpatient    Goals:  PO nutrition intake will meet >75% of patients estimated nutritional needs over the next 7 days.        Nutrition Monitoring and Evaluation:   Behavioral-Environmental Outcomes: None identified  Food/Nutrient Intake Outcomes: Food and nutrient intake, Supplement intake, Vitamin/mineral intake  Physical Signs/Symptoms Outcomes: Constipation, Meal time behavior, Nutrition focused physical findings, Weight    Discharge Planning:    Continue oral nutrition supplement, Continue current diet     Electronically signed by Leon Blair RD on 6/3/2021 at 10:16 AM    Contact Number: [phone]

## 2021-06-07 ENCOUNTER — HOSPITAL ENCOUNTER (OUTPATIENT)
Dept: GENERAL RADIOLOGY | Age: 86
Discharge: HOME | End: 2021-06-07
Payer: MEDICARE

## 2021-06-07 DIAGNOSIS — M25.50 PAIN IN JOINT, MULTIPLE SITES: ICD-10-CM

## 2021-06-07 PROCEDURE — 72110 X-RAY EXAM L-2 SPINE 4/>VWS: CPT

## 2021-06-07 PROCEDURE — 73560 X-RAY EXAM OF KNEE 1 OR 2: CPT

## 2021-06-07 NOTE — DISCHARGE SUMMARY
[facility]  Inpatient Psychiatry   Discharge Summary     Admit date: 5/27/2021    Discharge date and time: 6/3/2021  2:30 PM    Discharge Physician: Rudy Oliva MD    DISCHARGE DIAGNOSES     Psychiatric Diagnoses:   Patient Active Problem List   Diagnosis Code    Complex renal cyst N28.1    Suicide attempt [facility]) T14.91XA    Acute metabolic encephalopathy L33.05    Severe protein-calorie malnutrition ([facility] [address] E43    Major depressive disorder, single episode, severe without psychotic features ([facility] [address] F32.2       Level of impairment/disability:  None    HOSPITAL COURSE     Geovani Sawant is a 80 y.o. WHITE female who was admitted from the medical unit after overdosing on several medications in a suicide attempt. The patient has a history of chronic osteoarthritis and was experiencing severe pain prior to overdose. She felt that her pain medication regimen of Tylenol and tramadol was not helping and she was tired of living in pain so she decided to end her life by overdosing. However after she took the overdose she called her daughter-in-law who called EMS and she was brought to the hospital.  She was medically cleared on the medical unit and then transferred to the psychiatric unit for treatment. The patient received individual, group and medication therapy while on the psychiatric unit. She was started on Cymbalta 20 mg twice daily on the medical unit and this was continued for depression. She was also prescribed Lidocaine and Duragesic patches for pain. She was able to get some relief with the patches. She rated her pain as 4 on a scale of 1-10 on day of discharge and felt that it was better. She actively participated in the treatment plan. She interacted appropriately with staff and peers on the [facility]. She attended groups with good participation. Her mood was euthymic on the [facility] and she had  wide range of affect.   She was quite active on the unit and did not appear depressed at all.  The suicide attempt was processed with her and she reported being overwhelmed by her pain. However, she said she did not have any history of overdosing on medications or taking medications incorrectly and did not intend to do so in the future. She consistently denied suicidal ideations during hospitalization. She describes an active routine in the outpatient setting, which was confirmed by her son and daughter-in-law. Collateral information obtained from her daughter and son in law and they reported that patient did not show any signs of depression prior to admission and were very surprised by suicide attempt. However they have moved into her house temporarily and will be living with her for some period of time. Daughter-in-law will lock up her medications and will be in charge of administering them to her at the appropriate times. The patient reported that she was interested in outpatient psychotherapy. Appointment has been provided for her to follow-up with a therapist at [facility]. She will follow-up with her PCP for pain management and antidepressant prescription. No SI, HI, psychosis or tomas at time of discharge. The patient was not a behavioral problem. She did not require seclusion or restraint. She is being discharged home with her son and daughter-in-law. DISPOSITION/FOLLOW-UP     Disposition: Home    Follow-up Appointments:    Follow-up Information     Follow up With Specialties Details Why Contact Info    Jerrica Riley MD Internal Medicine   [address] [phone] counselor will be at [facility] ([facility])   [address] [facility], [address] [place] [address]     # [phone]                                                    Riri Tony LCSW on 6/ 11/ 2021  at 12:30pm  (Please arrive NOON to register )           Med Management with PCP Dr Donna Montague  on 6 / 8 / 2021 at 10:30am MEDICATION CHANGES   Outpatient medications:  No current facility-administered medications on file prior to encounter. Current Outpatient Medications on File Prior to Encounter   Medication Sig Dispense Refill    lidocaine 4 % patch Apply patch to right thigh and hip . Apply patch to the affected area for 12 hours a day and remove for 12 hours a day. 30 Patch 0    DULoxetine (CYMBALTA) 20 mg capsule Take 1 Capsule by mouth two (2) times a day. 60 Capsule 0    acetaminophen (TYLENOL) 325 mg tablet Take 2 Tablets by mouth every six (6) hours as needed for Pain. Indications: pain associated with arthritis 30 Tablet 0    diclofenac (VOLTAREN) 1 % gel Apply 2 g to affected area four (4) times daily. Gel should be measured and applied using the supplied dosing card. Apply dose (2 gm or 4 gm) to each location. If treatment site is the hands, patients should wait at least one (1) hour to wash their hands. APPLY TO lower back and right hip    Nursing, document site in comments 100 g 0    ferrous sulfate (IRON) 325 mg (65 mg iron) tablet Take 65 mg by mouth Daily (before breakfast).  cholecalciferol, VITAMIN D3, (VITAMIN D3) 5,000 unit tab tablet Take 5,000 Units by mouth daily.  Biotin 2,500 mcg cap Take 5,000 mcg by mouth.  multivitamin (ONE A DAY) tablet Take 1 Tab by mouth daily.  ibandronate (BONIVA) 150 mg tablet Take 150 mg by mouth every thirty (30) days.  aspirin 81 mg tablet Take 81 mg by mouth.  calcium 500 mg Tab Take 1,500 Units by mouth daily.  esomeprazole (NEXIUM) 20 mg capsule Take 40 mg by mouth daily.  fentaNYL (DURAGESIC) 12 mcg/hr patch 1 Patch by TransDERmal route every seventy-two (72) hours for 15 days. Max Daily Amount: 1 Patch. Do not cut patch. Apply to hairless area of upper torso (chest, back, flank, or upper  arm). Apply on a different skin site from the previous location. Remove old patch before new application.  5 Patch 0    melatonin 5 mg tablet Take 1 Tablet by mouth nightly. 10 Tablet 0    denosumab (PROLIA) 60 mg/mL injection 60 mg by SubCUTAneous route every 6 months. Discharged medication:  Discharge Medication List as of 6/3/2021  1:31 PM      CONTINUE these medications which have NOT CHANGED    Details   lidocaine 4 % patch Apply patch to right thigh and hip . Apply patch to the affected area for 12 hours a day and remove for 12 hours a day., No Print, Disp-30 Patch, R-0      DULoxetine (CYMBALTA) 20 mg capsule Take 1 Capsule by mouth two (2) times a day., No Print, Disp-60 Capsule, R-0      acetaminophen (TYLENOL) 325 mg tablet Take 2 Tablets by mouth every six (6) hours as needed for Pain. Indications: pain associated with arthritis, No Print, Disp-30 Tablet, R-0      diclofenac (VOLTAREN) 1 % gel Apply 2 g to affected area four (4) times daily. Gel should be measured and applied using the supplied dosing card. Apply dose (2 gm or 4 gm) to each location. If treatment site is the hands, patients should wait at least one (1) hour to wash their chapman ds. APPLY TO lower back and right hip    Nursing, document site in comments, No Print, Disp-100 g, R-0      ferrous sulfate (IRON) 325 mg (65 mg iron) tablet Take 65 mg by mouth Daily (before breakfast). , Historical Med      cholecalciferol, VITAMIN D3, (VITAMIN D3) 5,000 unit tab tablet Take 5,000 Units by mouth daily. , Historical Med      Biotin 2,500 mcg cap Take 5,000 mcg by mouth., Historical Med      multivitamin (ONE A DAY) tablet Take 1 Tab by mouth daily. , Historical Med      ibandronate (BONIVA) 150 mg tablet Take 150 mg by mouth every thirty (30) days. , Historical Med      aspirin 81 mg tablet Take 81 mg by mouth.  , Historical Med      calcium 500 mg Tab Take 1,500 Units by mouth daily. , Historical Med      esomeprazole (NEXIUM) 20 mg capsule Take 40 mg by mouth daily. , Historical Med      fentaNYL (DURAGESIC) 12 mcg/hr patch 1 Patch by TransDERmal route every seventy-two (72) hours for 15 days. Max Daily Amount: 1 Patch. Do not cut patch. Apply to hairless area of upper torso (chest, back, flank, or upper  arm). Apply on a different skin site from the previous location. Lauri ve old patch before new application. , No Print, Disp-5 Patch, R-0      melatonin 5 mg tablet Take 1 Tablet by mouth nightly., No Print, Disp-10 Tablet, R-0      denosumab (PROLIA) 60 mg/mL injection 60 mg by SubCUTAneous route every 6 months., Historical Med         STOP taking these medications       zolpidem (AMBIEN) 10 mg tablet Comments:   Reason for Stopping:         lidocaine (LIDODERM) 5 % Comments:   Reason for Stopping:         suvorexant (BELSOMRA) 5 mg tablet Comments:   Reason for Stopping:         traMADol (ULTRAM) 50 mg tablet Comments:   Reason for Stopping:         beclomethasone (QVAR) 40 mcg/actuation aero Comments:   Reason for Stopping:         ALPRAZolam (XANAX) 0.5 mg tablet Comments:   Reason for Stopping:               Instructions, risks (black box warning), benefits and side effects (EPS, TD, NMS) were discussed in detail prior to discharge. Patient denied any adverse medication side effects prior to discharge. LABS/IMAGING DURING ADMISSION     Results for orders placed or performed during the hospital encounter of 98/39/07   METABOLIC PANEL, BASIC   Result Value Ref Range    Sodium 134 (L) 136 - 145 mmol/L    Potassium 4.7 3.5 - 5.5 mmol/L    Chloride 103 100 - 111 mmol/L    CO2 27 21 - 32 mmol/L    Anion gap 4 3.0 - 18 mmol/L    Glucose 115 (H) 74 - 99 mg/dL    BUN 13 7.0 - 18 MG/DL    Creatinine 0.99 0.6 - 1.3 MG/DL    BUN/Creatinine ratio 13 12 - 20      GFR est AA >60 >60 ml/min/1.73m2    GFR est non-AA 53 (L) >60 ml/min/1.73m2    Calcium 8.8 8.5 - 10.1 MG/DL        DISCHARGE MENTAL STATUS EVALUATION     Appearance/Hygiene 80 y.o.  WHITE female  Hygiene: Good   Attitude/Behavior/Social Relatedness Appropriate   Musculoskeletal Gait/Station: appropriate  Tone (flaccid, cogwheeling, spastic): not assessed  Psychomotor (hyperkinetic, hypokinetic): calm  Involuntary movements (tics, dyskinesias, akathisa, stereotypies): none   Speech   Rate, rhythm, volume, fluency and articulation are appropriate   Mood   euthymic   Affect    congruent   Thought Process Linear and goal directed   Thought Content and Perceptual Disturbances Denies self-injurious behavior (SIB), suicidal ideation (SI), aggressive behavior or homicidal ideation (HI)    Denies auditory and visual hallucinations   Sensorium and Cognition  Grossly intact   Insight  fair   Judgment  good       SUICIDE RISK ASSESSMENT     [] Admission  [x] Discharge     Risk factors for suicide include age, sex, prior attempts, chronic pain, depressive disorder, no spouse. The above risk factors are static and not modifiable. Protective factor includes supportive family. Patient's children will be staying with her and managing her medications. There are no guns in the home per her children's report. The patient is therefore deemed to be at a low acute risk of suicide at this time. Completion of discharge was greater than 30 minutes. Over 50% of today's discharge was geared towards counseling and coordination of care.           Marino Warren MD  Psychiatry  [facility]

## 2021-06-23 ENCOUNTER — HOSPITAL ENCOUNTER (EMERGENCY)
Age: 86
Discharge: HOME | End: 2021-06-23
Attending: EMERGENCY MEDICINE
Payer: MEDICARE

## 2021-06-23 ENCOUNTER — APPOINTMENT (OUTPATIENT)
Dept: CT IMAGING | Age: 86
End: 2021-06-23
Attending: EMERGENCY MEDICINE
Payer: MEDICARE

## 2021-06-23 VITALS
TEMPERATURE: 97.7 F | RESPIRATION RATE: 18 BRPM | HEART RATE: 90 BPM | WEIGHT: 104 LBS | DIASTOLIC BLOOD PRESSURE: 67 MMHG | OXYGEN SATURATION: 100 % | BODY MASS INDEX: 20.42 KG/M2 | HEIGHT: 60 IN | SYSTOLIC BLOOD PRESSURE: 104 MMHG

## 2021-06-23 DIAGNOSIS — S22.009A CLOSED FRACTURE OF MULTIPLE THORACIC VERTEBRAE, INITIAL ENCOUNTER (HCC): Primary | ICD-10-CM

## 2021-06-23 PROCEDURE — 70486 CT MAXILLOFACIAL W/O DYE: CPT

## 2021-06-23 PROCEDURE — 70450 CT HEAD/BRAIN W/O DYE: CPT

## 2021-06-23 PROCEDURE — 71250 CT THORAX DX C-: CPT

## 2021-06-23 PROCEDURE — 99282 EMERGENCY DEPT VISIT SF MDM: CPT

## 2021-06-23 RX ORDER — ZOLPIDEM TARTRATE 10 MG/1
TABLET ORAL
COMMUNITY

## 2021-06-23 RX ORDER — ACETAMINOPHEN 500 MG
1000 TABLET ORAL
Qty: 20 TABLET | Refills: 0 | Status: SHIPPED | OUTPATIENT
Start: 2021-06-23

## 2021-06-23 NOTE — ED TRIAGE NOTES
Patient's daughter-in-law, Damien Rosenberg, states that patient lives alone. She states that patient has stated multiple falls during the night while attempting to get up to bathroom. She states last fall was probably on Saturday. Patient does not recall which day last fall occurred. Ariane states that patient sustained bruising, facial injury, back injury and left knee abrasion. Patient noted to have abrasion to left knee. Bruises in various stages of healing noted to face, arms and legs. Patient c/o \"bone sticking out in my back\" and points to left posterior ribs.

## 2021-06-23 NOTE — ED NOTES
Arsenio Malik is a 80 y.o. female that was discharged in stable condition. The patients diagnosis, condition and treatment were explained to  patient and aftercare instructions were given. The patient verbalized understanding. Patient armband removed and shredded.

## 2021-06-23 NOTE — ED PROVIDER NOTES
EMERGENCY DEPARTMENT HISTORY AND PHYSICAL EXAM    Date: 6/23/2021  Patient Name: Yazan Dawn    History of Presenting Illness     Chief Complaint   Patient presents with   Leslie [place] Fall    Rib Pain    Facial Injury         History Provided By: Patient and Patient's Daughter      Additional History (Context): Yazan Dawn is a 80 y.o. female with osteoarthritis, malignancy, asthma and Osteoporosis who presents with left posterior thoracic pain after falling this weekend. She said she was getting up to go to the bathroom fell hit her face and then her back. Denies anticoagulation. Denies neck discomfort. Pain is worse with trying to move her chest.    PCP: Yesenia Pearson MD    Current Outpatient Medications   Medication Sig Dispense Refill    zolpidem (Ambien) 10 mg tablet Take  by mouth nightly as needed for Sleep.  acetaminophen (Tylenol Extra Strength) 500 mg tablet Take 2 Tablets by mouth every six (6) hours as needed for Pain. 20 Tablet 0    lidocaine 4 % patch Apply patch to right thigh and hip . Apply patch to the affected area for 12 hours a day and remove for 12 hours a day. 30 Patch 0    DULoxetine (CYMBALTA) 20 mg capsule Take 1 Capsule by mouth two (2) times a day. 60 Capsule 0    Biotin 2,500 mcg cap Take 5,000 mcg by mouth.  esomeprazole (NEXIUM) 20 mg capsule Take 40 mg by mouth daily.  ferrous sulfate (IRON) 325 mg (65 mg iron) tablet Take 65 mg by mouth Daily (before breakfast).  cholecalciferol, VITAMIN D3, (VITAMIN D3) 5,000 unit tab tablet Take 5,000 Units by mouth daily.  multivitamin (ONE A DAY) tablet Take 1 Tab by mouth daily.  aspirin 81 mg tablet Take 81 mg by mouth.  calcium 500 mg Tab Take 1,500 Units by mouth daily.            Past History     Past Medical History:  Past Medical History:   Diagnosis Date    Arthritis     Asthma     Cancer ([facility] [address]     hx of breast cancer    Disorder     scoliosis    Osteoporosis     Other ill-defined conditions(799.89)     osteoporosis,        Past Surgical History:  Past Surgical History:   Procedure Laterality Date    HX HEENT      cataract surgery with lens replacement    [place] BREAST SURGERY PROCEDURE UNLISTED      mastectomy - left       Family History:  Family History   Problem Relation Age of Onset    Cancer Neg Hx     Diabetes Neg Hx     Heart Disease Neg Hx     Hypertension Neg Hx     Stroke Neg Hx        Social History:  Social History     Tobacco Use    Smoking status: Never Smoker    Smokeless tobacco: Never Used   Substance Use Topics    Alcohol use: Yes     Alcohol/week: 0.8 standard drinks     Types: 1 Glasses of wine per week    Drug use: No       Allergies: Allergies   Allergen Reactions    Sulfa (Sulfonamide Antibiotics) Unknown (comments)         Review of Systems   Review of Systems   Constitutional: Negative. Negative for fatigue and fever. HENT: Negative. Negative for rhinorrhea and sore throat. Eyes: Negative. Respiratory: Negative for cough and shortness of breath. Cardiovascular: Positive for chest pain. Negative for palpitations. Gastrointestinal: Negative for abdominal pain, diarrhea, nausea and vomiting. Endocrine: Negative. Genitourinary: Negative. Negative for difficulty urinating and dysuria. Musculoskeletal: Negative. Negative for arthralgias and myalgias. Skin: Negative. Negative for color change and rash. Allergic/Immunologic: Negative. Neurological: Negative for syncope, light-headedness and headaches. Hematological: Does not bruise/bleed easily. All Other Systems Negative  Physical Exam     Vitals:    06/23/21 1235   BP: 104/67   Pulse: 90   Resp: 18   Temp: 97.7 °F (36.5 °C)   SpO2: 100%   Weight: 47.2 kg (104 lb)   Height: 5' (1.524 m)     Physical Exam  Vitals and nursing note reviewed. Constitutional:       Appearance: She is well-developed. HENT:      Head: Normocephalic.       Comments: Moderate bilateral periorbital ecchymosis and nasal bridge ecchymosis. Eyes:      Pupils: Pupils are equal, round, and reactive to light. Neck:      Thyroid: No thyromegaly. Vascular: No JVD. Trachea: No tracheal deviation. Cardiovascular:      Rate and Rhythm: Normal rate and regular rhythm. Heart sounds: Normal heart sounds. No murmur heard. No friction rub. No gallop. Pulmonary:      Effort: Pulmonary effort is normal. No respiratory distress. Breath sounds: Normal breath sounds. No stridor. No wheezing or rales. Comments: Posterior lateral left-sided rib discomfort approximately T7-T8  Chest:      Chest wall: Tenderness present. Abdominal:      General: There is no distension. Palpations: Abdomen is soft. There is no mass. Tenderness: There is no abdominal tenderness. There is no guarding or rebound. Musculoskeletal:         General: No tenderness. Cervical back: Neck supple. No tenderness. Lymphadenopathy:      Cervical: No cervical adenopathy. Skin:     General: Skin is warm and dry. Coloration: Skin is not pale. Findings: No erythema or rash. Neurological:      Mental Status: She is alert and oriented to person, place, and time. Psychiatric:         Behavior: Behavior normal.         Thought Content: Thought content normal.          Diagnostic Study Results     Labs -   No results found for this or any previous visit (from the past 12 hour(s)). Radiologic Studies -   CT MAXILLOFACIAL WO CONT   Final Result   See separate dedicated CT head and chest reports. No acute findings of trauma. Mild ethmoid and maxillary sinusitis. CT HEAD WO CONT   Final Result      No acute findings of trauma. Mild to moderate generalized volume loss and mild sequela of chronic   microvascular ischemic disease. Mild left ethmoid sinusitis. CT CHEST WO CONT   Final Result   1. Age-indeterminate T3 and T5 compression fractures. No prior imaging for   comparison.    2.  Two 1.1 cm pulmonary nodules and a separate band of consolidation at the   medial left lung apex, the latter would not be compatible with malignancy but   the former nodules are indeterminate. Given size, may consider follow-up chest   CT in 3 months, PET CT and/or biopsy. 3.  Mild bronchitis or central venous congestion. 4.  Moderate to severe LAD coronary arteriosclerosis. 5.  Small left pleural effusion. 6.  A few nonspecific scattered pulmonary cysts. 7.  Moderate sliding hiatal hernia. 8.  Mild anasarca. CT Results  (Last 48 hours)               06/23/21 1329  CT MAXILLOFACIAL WO CONT Final result    Impression:  See separate dedicated CT head and chest reports. No acute findings of trauma. Mild ethmoid and maxillary sinusitis. Narrative:  CT FACIAL BONES WITHOUT CONTRAST       HISTORY: Fall with facial trauma. TECHNIQUE: Axial images through the maxillofacial structures followed by coronal   reformation for better evaluation of facial integrity, bony orbits, and to   minimize radiation dose. All CT scans at this facility are performed using dose optimization technique as   appropriate to a performed exam, to include automated exposure control,   adjustment of the mA and/or kV according to patient size (including appropriate   matching first site-specific examinations), or use of iterative reconstruction   technique. COMPARISON:none       RESULT:       Status post cataract surgery. Nasal cavity and oral cavity is normal. No   significant focal soft tissue thickening. Nasal bones, zygomatic arches, orbital walls, sinus walls, mandible, pterygoid   plates are intact. Mild left ethmoid and right maxillary opacification. Mild   sinus mucosal disease. Mastoid air cells are patent. 06/23/21 1329  CT HEAD WO CONT Final result    Impression:      No acute findings of trauma.    Mild to moderate generalized volume loss and mild sequela of chronic microvascular ischemic disease. Mild left ethmoid sinusitis. Narrative:  CT HEAD WO CONT       History: History of breast cancer with fall this weekend, left posterior   thoracic pain, blunt facial trauma. Comparison: 5/25/2021       TECHNIQUE: 5 mm helical scan obtained of the head were obtained from the skull   vertex through the base of the skull without intravenous contrast.         All CT scans at this facility are performed using dose optimization technique as   appropriate to a performed exam, to include automated exposure control,   adjustment of the mA and/or kV according to patient size (including appropriate   matching first site-specific examinations), or use of iterative reconstruction   technique. BRAIN RESULT:         Gray-white matter differentiation is maintained. Mild to moderate generalized   volume loss. Patchy supratentorial hypodensities. No midline shift. Basilar   cisterns are patent. No acute intracranial hemorrhage. Status post cataract surgery. Soft tissues and osseous structures are grossly   normal. Mild dependent opacity in the left ethmoid sinus. Mild sinus mucosal   thickening. Mastoid air cells are patent. 06/23/21 1329  CT CHEST WO CONT Final result    Impression:  1. Age-indeterminate T3 and T5 compression fractures. No prior imaging for   comparison. 2.  Two 1.1 cm pulmonary nodules and a separate band of consolidation at the   medial left lung apex, the latter would not be compatible with malignancy but   the former nodules are indeterminate. Given size, may consider follow-up chest   CT in 3 months, PET CT and/or biopsy. 3.  Mild bronchitis or central venous congestion. 4.  Moderate to severe LAD coronary arteriosclerosis. 5.  Small left pleural effusion. 6.  A few nonspecific scattered pulmonary cysts. 7.  Moderate sliding hiatal hernia. 8.  Mild anasarca.            Narrative:  CT CHEST WITHOUT ENHANCEMENT INDICATION: Fall with thoracic pain. TECHNIQUE: Axial images obtained from the thoracic inlet to the level of the   diaphragm without intravenous contrast. Coronal and sagittal reformatted images   were obtained. All CT scans at this facility are performed using dose optimization technique as   appropriate to a performed exam, to include automated exposure control,   adjustment of the mA and/or kV according to patient size (including appropriate   matching first site-specific examinations), or use of iterative reconstruction   technique. COMPARISON: None. CHEST FINDINGS:    The evaluation of the mediastinum, hilum and vascular structures is limited in   the absence of intravenous contrast.           Thyroid: Unremarkable in its visualized aspects. Pericardium/ Heart: No pericardial effusion. Moderate to severe LAD coronary   arteriosclerosis. Aorta/ Vessels: Mild aortic atherosclerosis. Lymph Nodes: Unremarkable. .       Lungs: Mild bronchial wall thickening. There are a few scattered cysts. There is   a 1.2 x 1 cm well-circumscribed solid nodule in the left lower lobe with a   medial abutting calcified nodule. 1.7 x 0.5 cm nodule (1.1 cm average) in the   medial right middle lobe (42). There is a band of thickening along the medial   left apex which measures 2.6 x 0.7 cm. Pleura: Small left pleural effusion. No pneumothorax. Upper Abdomen: Moderate sliding hiatal hernia. Bones/soft tissues: Bilateral breast implants, decompressed on the right with   coarse peripheral calcification. Mild anasarca. Lumbar facet hypertrophy and   arthropathy. Degenerative disc disease. Mild anterior compression fracture T3. Osteopenia. There is mild central height loss at T5. CXR Results  (Last 48 hours)    None            Medical Decision Making   I am the first provider for this patient.     I reviewed the vital signs, available nursing notes, past medical history, past surgical history, family history and social history. Vital Signs-Reviewed the patient's vital signs. Records Reviewed: Nursing Notes    Procedures:  Procedures    Provider Notes (Medical Decision Making): ETT 5 vertebral fractures. Treat his pain. Follow-up with spine Ortho. Abnormal CT chest findings discussed w/pt and family recommending close f/up with her PCP. H/o breast ca. MED RECONCILIATION:  No current facility-administered medications for this encounter. Current Outpatient Medications   Medication Sig    zolpidem (Ambien) 10 mg tablet Take  by mouth nightly as needed for Sleep.  acetaminophen (Tylenol Extra Strength) 500 mg tablet Take 2 Tablets by mouth every six (6) hours as needed for Pain.  lidocaine 4 % patch Apply patch to right thigh and hip . Apply patch to the affected area for 12 hours a day and remove for 12 hours a day.  DULoxetine (CYMBALTA) 20 mg capsule Take 1 Capsule by mouth two (2) times a day.  Biotin 2,500 mcg cap Take 5,000 mcg by mouth.  esomeprazole (NEXIUM) 20 mg capsule Take 40 mg by mouth daily.  ferrous sulfate (IRON) 325 mg (65 mg iron) tablet Take 65 mg by mouth Daily (before breakfast).  cholecalciferol, VITAMIN D3, (VITAMIN D3) 5,000 unit tab tablet Take 5,000 Units by mouth daily.  multivitamin (ONE A DAY) tablet Take 1 Tab by mouth daily.  aspirin 81 mg tablet Take 81 mg by mouth.  calcium 500 mg Tab Take 1,500 Units by mouth daily. Disposition:  home    DISCHARGE NOTE:   2:12 PM    Pt has been reexamined. Patient has no new complaints, changes, or physical findings. Care plan outlined and precautions discussed. Results of xct scans were reviewed with the patient. All medications were reviewed with the patient; will d/c home with tylenol extra strength. All of pt's questions and concerns were addressed.  Patient was instructed and agrees to follow up with ortho, as well as to return to the ED upon further deterioration. Patient is ready to go home. Follow-up Information     Follow up With Specialties Details Why Contact Info    Jazmine Gray MD Orthopedic Surgery Schedule an appointment as soon as possible for a visit in 1 day  [address]  [address] [place] [phone] DEPT Emergency Medicine  If symptoms worsen return immediately [address] [facility]  [phone]          Current Discharge Medication List      CONTINUE these medications which have CHANGED    Details   acetaminophen (Tylenol Extra Strength) 500 mg tablet Take 2 Tablets by mouth every six (6) hours as needed for Pain. Qty: 20 Tablet, Refills: 0  Start date: 6/23/2021             Diagnosis     Clinical Impression:   1.  Closed fracture of multiple thoracic vertebrae, initial encounter ([facility] [address]

## 2022-03-18 PROBLEM — E43 SEVERE PROTEIN-CALORIE MALNUTRITION (HCC): Status: ACTIVE | Noted: 2021-05-26

## 2022-03-18 PROBLEM — N28.1 COMPLEX RENAL CYST: Status: ACTIVE | Noted: 2017-12-18

## 2022-03-18 PROBLEM — T14.91XA SUICIDE ATTEMPT (HCC): Status: ACTIVE | Noted: 2021-05-25

## 2022-03-19 PROBLEM — F32.2 MAJOR DEPRESSIVE DISORDER, SINGLE EPISODE, SEVERE WITHOUT PSYCHOTIC FEATURES (HCC): Status: ACTIVE | Noted: 2021-05-28

## 2022-03-19 PROBLEM — G93.41 ACUTE METABOLIC ENCEPHALOPATHY: Status: ACTIVE | Noted: 2021-05-25

## 2023-01-01 ENCOUNTER — APPOINTMENT (OUTPATIENT)
Facility: HOSPITAL | Age: 88
DRG: 917 | End: 2023-01-01
Payer: MEDICARE

## 2023-01-01 ENCOUNTER — HOSPITAL ENCOUNTER (INPATIENT)
Facility: HOSPITAL | Age: 88
LOS: 11 days | DRG: 917 | End: 2023-06-25
Attending: EMERGENCY MEDICINE | Admitting: STUDENT IN AN ORGANIZED HEALTH CARE EDUCATION/TRAINING PROGRAM
Payer: MEDICARE

## 2023-01-01 VITALS
RESPIRATION RATE: 46 BRPM | HEART RATE: 127 BPM | WEIGHT: 100 LBS | BODY MASS INDEX: 19.63 KG/M2 | SYSTOLIC BLOOD PRESSURE: 92 MMHG | OXYGEN SATURATION: 87 % | DIASTOLIC BLOOD PRESSURE: 58 MMHG | TEMPERATURE: 97.7 F | HEIGHT: 60 IN

## 2023-01-01 DIAGNOSIS — R41.82 ALTERED MENTAL STATUS, UNSPECIFIED ALTERED MENTAL STATUS TYPE: Primary | ICD-10-CM

## 2023-01-01 LAB
ABO + RH BLD: NORMAL
ALBUMIN SERPL-MCNC: 3.1 G/DL (ref 3.4–5)
ALBUMIN SERPL-MCNC: 3.6 G/DL (ref 3.4–5)
ALBUMIN/GLOB SERPL: 0.8 (ref 0.8–1.7)
ALBUMIN/GLOB SERPL: 1 (ref 0.8–1.7)
ALP SERPL-CCNC: 86 U/L (ref 45–117)
ALP SERPL-CCNC: 93 U/L (ref 45–117)
ALT SERPL-CCNC: 17 U/L (ref 13–56)
ALT SERPL-CCNC: 19 U/L (ref 13–56)
AMPHET UR QL SCN: NEGATIVE
ANION GAP SERPL CALC-SCNC: 10 MMOL/L (ref 3–18)
ANION GAP SERPL CALC-SCNC: 2 MMOL/L (ref 3–18)
ANION GAP SERPL CALC-SCNC: 3 MMOL/L (ref 3–18)
ANION GAP SERPL CALC-SCNC: 6 MMOL/L (ref 3–18)
APAP SERPL-MCNC: <2 UG/ML (ref 10–30)
APPEARANCE UR: CLEAR
ARTERIAL PATENCY WRIST A: POSITIVE
ARTERIAL PATENCY WRIST A: POSITIVE
AST SERPL-CCNC: 18 U/L (ref 10–38)
AST SERPL-CCNC: 22 U/L (ref 10–38)
BARBITURATES UR QL SCN: NEGATIVE
BASE EXCESS BLD CALC-SCNC: 1.9 MMOL/L
BASE EXCESS BLD CALC-SCNC: 3 MMOL/L
BASOPHILS # BLD: 0 K/UL (ref 0–0.1)
BASOPHILS # BLD: 0 K/UL (ref 0–0.1)
BASOPHILS NFR BLD: 0 % (ref 0–2)
BASOPHILS NFR BLD: 0 % (ref 0–2)
BDY SITE: ABNORMAL
BDY SITE: ABNORMAL
BENZODIAZ UR QL: POSITIVE
BILIRUB DIRECT SERPL-MCNC: <0.1 MG/DL (ref 0–0.2)
BILIRUB SERPL-MCNC: 0.2 MG/DL (ref 0.2–1)
BILIRUB SERPL-MCNC: 0.3 MG/DL (ref 0.2–1)
BILIRUB UR QL: NEGATIVE
BLD PROD TYP BPU: NORMAL
BLOOD BANK DISPENSE STATUS: NORMAL
BLOOD GROUP ANTIBODIES SERPL: NORMAL
BPU ID: NORMAL
BUN SERPL-MCNC: 14 MG/DL (ref 7–18)
BUN SERPL-MCNC: 17 MG/DL (ref 7–18)
BUN SERPL-MCNC: 19 MG/DL (ref 7–18)
BUN SERPL-MCNC: 23 MG/DL (ref 7–18)
BUN/CREAT SERPL: 11 (ref 12–20)
BUN/CREAT SERPL: 13 (ref 12–20)
BUN/CREAT SERPL: 14 (ref 12–20)
BUN/CREAT SERPL: 16 (ref 12–20)
CALCIUM SERPL-MCNC: 9.3 MG/DL (ref 8.5–10.1)
CALCIUM SERPL-MCNC: 9.3 MG/DL (ref 8.5–10.1)
CALCIUM SERPL-MCNC: 9.4 MG/DL (ref 8.5–10.1)
CALCIUM SERPL-MCNC: 9.8 MG/DL (ref 8.5–10.1)
CALLED TO: NORMAL
CANNABINOIDS UR QL SCN: NEGATIVE
CHLORIDE SERPL-SCNC: 106 MMOL/L (ref 100–111)
CHLORIDE SERPL-SCNC: 107 MMOL/L (ref 100–111)
CHLORIDE SERPL-SCNC: 108 MMOL/L (ref 100–111)
CHLORIDE SERPL-SCNC: 109 MMOL/L (ref 100–111)
CK SERPL-CCNC: 82 U/L (ref 26–192)
CO2 SERPL-SCNC: 26 MMOL/L (ref 21–32)
CO2 SERPL-SCNC: 28 MMOL/L (ref 21–32)
CO2 SERPL-SCNC: 29 MMOL/L (ref 21–32)
CO2 SERPL-SCNC: 29 MMOL/L (ref 21–32)
COCAINE UR QL SCN: NEGATIVE
COLOR UR: YELLOW
CREAT SERPL-MCNC: 1.25 MG/DL (ref 0.6–1.3)
CREAT SERPL-MCNC: 1.28 MG/DL (ref 0.6–1.3)
CREAT SERPL-MCNC: 1.35 MG/DL (ref 0.6–1.3)
CREAT SERPL-MCNC: 1.42 MG/DL (ref 0.6–1.3)
CROSSMATCH RESULT: NORMAL
D DIMER PPP FEU-MCNC: 4.43 UG/ML(FEU)
DIFFERENTIAL METHOD BLD: ABNORMAL
DIFFERENTIAL METHOD BLD: ABNORMAL
EKG ATRIAL RATE: 115 BPM
EKG ATRIAL RATE: 81 BPM
EKG ATRIAL RATE: 94 BPM
EKG DIAGNOSIS: NORMAL
EKG P AXIS: 61 DEGREES
EKG P AXIS: 76 DEGREES
EKG P-R INTERVAL: 140 MS
EKG P-R INTERVAL: 140 MS
EKG P-R INTERVAL: 150 MS
EKG Q-T INTERVAL: 322 MS
EKG Q-T INTERVAL: 388 MS
EKG Q-T INTERVAL: 396 MS
EKG QRS DURATION: 66 MS
EKG QRS DURATION: 66 MS
EKG QRS DURATION: 74 MS
EKG QTC CALCULATION (BAZETT): 445 MS
EKG QTC CALCULATION (BAZETT): 450 MS
EKG QTC CALCULATION (BAZETT): 495 MS
EKG R AXIS: 35 DEGREES
EKG R AXIS: 40 DEGREES
EKG R AXIS: 55 DEGREES
EKG T AXIS: 68 DEGREES
EKG T AXIS: 78 DEGREES
EKG T AXIS: 79 DEGREES
EKG VENTRICULAR RATE: 115 BPM
EKG VENTRICULAR RATE: 81 BPM
EKG VENTRICULAR RATE: 94 BPM
EOSINOPHIL # BLD: 0 K/UL (ref 0–0.4)
EOSINOPHIL # BLD: 0.1 K/UL (ref 0–0.4)
EOSINOPHIL NFR BLD: 0 % (ref 0–5)
EOSINOPHIL NFR BLD: 1 % (ref 0–5)
ERYTHROCYTE [DISTWIDTH] IN BLOOD BY AUTOMATED COUNT: 20.5 % (ref 11.6–14.5)
ERYTHROCYTE [DISTWIDTH] IN BLOOD BY AUTOMATED COUNT: 20.9 % (ref 11.6–14.5)
ERYTHROCYTE [DISTWIDTH] IN BLOOD BY AUTOMATED COUNT: 22.2 % (ref 11.6–14.5)
ETHANOL SERPL-MCNC: <3 MG/DL (ref 0–3)
ETHANOL SERPL-MCNC: <3 MG/DL (ref 0–3)
FERRITIN SERPL-MCNC: 5 NG/ML (ref 8–388)
FOLATE SERPL-MCNC: >20 NG/ML (ref 3.1–17.5)
GAS FLOW.O2 O2 DELIVERY SYS: ABNORMAL
GAS FLOW.O2 O2 DELIVERY SYS: ABNORMAL
GLOBULIN SER CALC-MCNC: 3.7 G/DL (ref 2–4)
GLOBULIN SER CALC-MCNC: 3.7 G/DL (ref 2–4)
GLUCOSE BLD STRIP.AUTO-MCNC: 122 MG/DL (ref 70–110)
GLUCOSE SERPL-MCNC: 103 MG/DL (ref 74–99)
GLUCOSE SERPL-MCNC: 108 MG/DL (ref 74–99)
GLUCOSE SERPL-MCNC: 115 MG/DL (ref 74–99)
GLUCOSE SERPL-MCNC: 94 MG/DL (ref 74–99)
GLUCOSE UR STRIP.AUTO-MCNC: NEGATIVE MG/DL
HCO3 BLD-SCNC: 26.9 MMOL/L (ref 22–26)
HCO3 BLD-SCNC: 28.5 MMOL/L (ref 22–26)
HCT VFR BLD AUTO: 23 % (ref 35–45)
HCT VFR BLD AUTO: 26 % (ref 35–45)
HCT VFR BLD AUTO: 27.7 % (ref 35–45)
HCT VFR BLD AUTO: 34.5 % (ref 35–45)
HGB BLD-MCNC: 6.4 G/DL (ref 12–16)
HGB BLD-MCNC: 7.5 G/DL (ref 12–16)
HGB BLD-MCNC: 7.8 G/DL (ref 12–16)
HGB BLD-MCNC: 9.9 G/DL (ref 12–16)
HGB UR QL STRIP: NEGATIVE
HISTORY CHECK: NORMAL
IMM GRANULOCYTES # BLD AUTO: 0 K/UL (ref 0–0.04)
IMM GRANULOCYTES # BLD AUTO: 0.1 K/UL (ref 0–0.04)
IMM GRANULOCYTES NFR BLD AUTO: 0 % (ref 0–0.5)
IMM GRANULOCYTES NFR BLD AUTO: 1 % (ref 0–0.5)
IRON SATN MFR SERPL: 3 % (ref 20–50)
IRON SERPL-MCNC: 15 UG/DL (ref 50–175)
KETONES UR QL STRIP.AUTO: NEGATIVE MG/DL
LACTATE BLD-SCNC: 0.48 MMOL/L (ref 0.4–2)
LACTATE BLD-SCNC: 0.66 MMOL/L (ref 0.4–2)
LEUKOCYTE ESTERASE UR QL STRIP.AUTO: NEGATIVE
LYMPHOCYTES # BLD: 0.4 K/UL (ref 0.9–3.6)
LYMPHOCYTES # BLD: 0.8 K/UL (ref 0.9–3.6)
LYMPHOCYTES NFR BLD: 3 % (ref 21–52)
LYMPHOCYTES NFR BLD: 7 % (ref 21–52)
Lab: ABNORMAL
MAGNESIUM SERPL-MCNC: 2.2 MG/DL (ref 1.6–2.6)
MCH RBC QN AUTO: 18.7 PG (ref 24–34)
MCH RBC QN AUTO: 18.7 PG (ref 24–34)
MCH RBC QN AUTO: 20.4 PG (ref 24–34)
MCHC RBC AUTO-ENTMCNC: 27.8 G/DL (ref 31–37)
MCHC RBC AUTO-ENTMCNC: 28.2 G/DL (ref 31–37)
MCHC RBC AUTO-ENTMCNC: 28.7 G/DL (ref 31–37)
MCV RBC AUTO: 66.3 FL (ref 78–100)
MCV RBC AUTO: 67.1 FL (ref 78–100)
MCV RBC AUTO: 71 FL (ref 78–100)
METHADONE UR QL: NEGATIVE
MONOCYTES # BLD: 0.8 K/UL (ref 0.05–1.2)
MONOCYTES # BLD: 1.1 K/UL (ref 0.05–1.2)
MONOCYTES NFR BLD: 10 % (ref 3–10)
MONOCYTES NFR BLD: 6 % (ref 3–10)
NEUTS SEG # BLD: 11.6 K/UL (ref 1.8–8)
NEUTS SEG # BLD: 8.9 K/UL (ref 1.8–8)
NEUTS SEG NFR BLD: 82 % (ref 40–73)
NEUTS SEG NFR BLD: 90 % (ref 40–73)
NITRITE UR QL STRIP.AUTO: NEGATIVE
NRBC # BLD: 0 K/UL (ref 0–0.01)
NRBC BLD-RTO: 0 PER 100 WBC
O2/TOTAL GAS SETTING VFR VENT: 100 %
O2/TOTAL GAS SETTING VFR VENT: 6 %
OPIATES UR QL: NEGATIVE
PCO2 BLD: 38 MMHG (ref 35–45)
PCO2 BLD: 54.4 MMHG (ref 35–45)
PCP UR QL: NEGATIVE
PH BLD: 7.33 (ref 7.35–7.45)
PH BLD: 7.46 (ref 7.35–7.45)
PH UR STRIP: 7 (ref 5–8)
PLATELET # BLD AUTO: 266 K/UL (ref 135–420)
PLATELET # BLD AUTO: 295 K/UL (ref 135–420)
PLATELET # BLD AUTO: 336 K/UL (ref 135–420)
PLATELET COMMENT: ABNORMAL
PLATELET COMMENT: ABNORMAL
PMV BLD AUTO: 9 FL (ref 9.2–11.8)
PMV BLD AUTO: 9.4 FL (ref 9.2–11.8)
PMV BLD AUTO: 9.6 FL (ref 9.2–11.8)
PO2 BLD: 148 MMHG (ref 80–100)
PO2 BLD: 33 MMHG (ref 80–100)
POTASSIUM SERPL-SCNC: 3.5 MMOL/L (ref 3.5–5.5)
POTASSIUM SERPL-SCNC: 3.5 MMOL/L (ref 3.5–5.5)
POTASSIUM SERPL-SCNC: 3.8 MMOL/L (ref 3.5–5.5)
POTASSIUM SERPL-SCNC: 4.1 MMOL/L (ref 3.5–5.5)
PROT SERPL-MCNC: 6.8 G/DL (ref 6.4–8.2)
PROT SERPL-MCNC: 7.3 G/DL (ref 6.4–8.2)
PROT UR STRIP-MCNC: NEGATIVE MG/DL
RBC # BLD AUTO: 3.43 M/UL (ref 4.2–5.3)
RBC # BLD AUTO: 4.18 M/UL (ref 4.2–5.3)
RBC # BLD AUTO: 4.86 M/UL (ref 4.2–5.3)
RBC MORPH BLD: ABNORMAL
RESPIRATORY RATE, POC: 26 (ref 5–40)
SALICYLATES SERPL-MCNC: <1.7 MG/DL (ref 2.8–20)
SAO2 % BLD: 66.8 % (ref 92–97)
SAO2 % BLD: 99.1 % (ref 92–97)
SERVICE CMNT-IMP: ABNORMAL
SERVICE CMNT-IMP: ABNORMAL
SODIUM SERPL-SCNC: 139 MMOL/L (ref 136–145)
SODIUM SERPL-SCNC: 140 MMOL/L (ref 136–145)
SODIUM SERPL-SCNC: 142 MMOL/L (ref 136–145)
SODIUM SERPL-SCNC: 142 MMOL/L (ref 136–145)
SP GR UR REFRACTOMETRY: 1.01 (ref 1–1.03)
SPECIMEN EXP DATE BLD: NORMAL
SPECIMEN TYPE: ABNORMAL
SPECIMEN TYPE: ABNORMAL
TIBC SERPL-MCNC: 435 UG/DL (ref 250–450)
UNIT DIVISION: 0
UROBILINOGEN UR QL STRIP.AUTO: 0.2 EU/DL (ref 0.2–1)
VIT B12 SERPL-MCNC: 1015 PG/ML (ref 211–911)
WBC # BLD AUTO: 10.9 K/UL (ref 4.6–13.2)
WBC # BLD AUTO: 12.9 K/UL (ref 4.6–13.2)
WBC # BLD AUTO: 9.7 K/UL (ref 4.6–13.2)

## 2023-01-01 PROCEDURE — 2700000000 HC OXYGEN THERAPY PER DAY

## 2023-01-01 PROCEDURE — 93010 ELECTROCARDIOGRAM REPORT: CPT | Performed by: INTERNAL MEDICINE

## 2023-01-01 PROCEDURE — 99233 SBSQ HOSP IP/OBS HIGH 50: CPT | Performed by: HOSPITALIST

## 2023-01-01 PROCEDURE — 6370000000 HC RX 637 (ALT 250 FOR IP): Performed by: INTERNAL MEDICINE

## 2023-01-01 PROCEDURE — 80048 BASIC METABOLIC PNL TOTAL CA: CPT

## 2023-01-01 PROCEDURE — 85018 HEMOGLOBIN: CPT

## 2023-01-01 PROCEDURE — 82803 BLOOD GASES ANY COMBINATION: CPT

## 2023-01-01 PROCEDURE — 2580000003 HC RX 258: Performed by: STUDENT IN AN ORGANIZED HEALTH CARE EDUCATION/TRAINING PROGRAM

## 2023-01-01 PROCEDURE — 1100000003 HC PRIVATE W/ TELEMETRY

## 2023-01-01 PROCEDURE — 6360000002 HC RX W HCPCS: Performed by: HOSPITALIST

## 2023-01-01 PROCEDURE — 83735 ASSAY OF MAGNESIUM: CPT

## 2023-01-01 PROCEDURE — 36600 WITHDRAWAL OF ARTERIAL BLOOD: CPT

## 2023-01-01 PROCEDURE — 99231 SBSQ HOSP IP/OBS SF/LOW 25: CPT | Performed by: INTERNAL MEDICINE

## 2023-01-01 PROCEDURE — 2500000003 HC RX 250 WO HCPCS: Performed by: INTERNAL MEDICINE

## 2023-01-01 PROCEDURE — 83605 ASSAY OF LACTIC ACID: CPT

## 2023-01-01 PROCEDURE — 99231 SBSQ HOSP IP/OBS SF/LOW 25: CPT | Performed by: NURSE PRACTITIONER

## 2023-01-01 PROCEDURE — 99232 SBSQ HOSP IP/OBS MODERATE 35: CPT | Performed by: INTERNAL MEDICINE

## 2023-01-01 PROCEDURE — 80053 COMPREHEN METABOLIC PANEL: CPT

## 2023-01-01 PROCEDURE — 99233 SBSQ HOSP IP/OBS HIGH 50: CPT | Performed by: INTERNAL MEDICINE

## 2023-01-01 PROCEDURE — 82550 ASSAY OF CK (CPK): CPT

## 2023-01-01 PROCEDURE — 83550 IRON BINDING TEST: CPT

## 2023-01-01 PROCEDURE — 85025 COMPLETE CBC W/AUTO DIFF WBC: CPT

## 2023-01-01 PROCEDURE — P9016 RBC LEUKOCYTES REDUCED: HCPCS

## 2023-01-01 PROCEDURE — 82607 VITAMIN B-12: CPT

## 2023-01-01 PROCEDURE — 81003 URINALYSIS AUTO W/O SCOPE: CPT

## 2023-01-01 PROCEDURE — 71045 X-RAY EXAM CHEST 1 VIEW: CPT

## 2023-01-01 PROCEDURE — 93005 ELECTROCARDIOGRAM TRACING: CPT

## 2023-01-01 PROCEDURE — 6360000002 HC RX W HCPCS: Performed by: STUDENT IN AN ORGANIZED HEALTH CARE EDUCATION/TRAINING PROGRAM

## 2023-01-01 PROCEDURE — 85379 FIBRIN DEGRADATION QUANT: CPT

## 2023-01-01 PROCEDURE — 83540 ASSAY OF IRON: CPT

## 2023-01-01 PROCEDURE — 2580000003 HC RX 258

## 2023-01-01 PROCEDURE — 99222 1ST HOSP IP/OBS MODERATE 55: CPT | Performed by: INTERNAL MEDICINE

## 2023-01-01 PROCEDURE — 86900 BLOOD TYPING SEROLOGIC ABO: CPT

## 2023-01-01 PROCEDURE — 70450 CT HEAD/BRAIN W/O DYE: CPT

## 2023-01-01 PROCEDURE — 82728 ASSAY OF FERRITIN: CPT

## 2023-01-01 PROCEDURE — 99285 EMERGENCY DEPT VISIT HI MDM: CPT

## 2023-01-01 PROCEDURE — 6370000000 HC RX 637 (ALT 250 FOR IP): Performed by: HOSPITALIST

## 2023-01-01 PROCEDURE — 86901 BLOOD TYPING SEROLOGIC RH(D): CPT

## 2023-01-01 PROCEDURE — 82077 ASSAY SPEC XCP UR&BREATH IA: CPT

## 2023-01-01 PROCEDURE — 86850 RBC ANTIBODY SCREEN: CPT

## 2023-01-01 PROCEDURE — 80143 DRUG ASSAY ACETAMINOPHEN: CPT

## 2023-01-01 PROCEDURE — 93005 ELECTROCARDIOGRAM TRACING: CPT | Performed by: INTERNAL MEDICINE

## 2023-01-01 PROCEDURE — 36415 COLL VENOUS BLD VENIPUNCTURE: CPT

## 2023-01-01 PROCEDURE — 80179 DRUG ASSAY SALICYLATE: CPT

## 2023-01-01 PROCEDURE — 82962 GLUCOSE BLOOD TEST: CPT

## 2023-01-01 PROCEDURE — 94761 N-INVAS EAR/PLS OXIMETRY MLT: CPT

## 2023-01-01 PROCEDURE — 99239 HOSP IP/OBS DSCHRG MGMT >30: CPT | Performed by: INTERNAL MEDICINE

## 2023-01-01 PROCEDURE — 99222 1ST HOSP IP/OBS MODERATE 55: CPT | Performed by: STUDENT IN AN ORGANIZED HEALTH CARE EDUCATION/TRAINING PROGRAM

## 2023-01-01 PROCEDURE — 82746 ASSAY OF FOLIC ACID SERUM: CPT

## 2023-01-01 PROCEDURE — 85014 HEMATOCRIT: CPT

## 2023-01-01 PROCEDURE — 86923 COMPATIBILITY TEST ELECTRIC: CPT

## 2023-01-01 PROCEDURE — 85027 COMPLETE CBC AUTOMATED: CPT

## 2023-01-01 PROCEDURE — 80076 HEPATIC FUNCTION PANEL: CPT

## 2023-01-01 PROCEDURE — 94760 N-INVAS EAR/PLS OXIMETRY 1: CPT

## 2023-01-01 PROCEDURE — 36430 TRANSFUSION BLD/BLD COMPNT: CPT

## 2023-01-01 PROCEDURE — 80307 DRUG TEST PRSMV CHEM ANLYZR: CPT

## 2023-01-01 RX ORDER — GLYCOPYRROLATE 0.2 MG/ML
0.2 INJECTION INTRAMUSCULAR; INTRAVENOUS EVERY 4 HOURS PRN
Status: DISCONTINUED | OUTPATIENT
Start: 2023-01-01 | End: 2023-01-01

## 2023-01-01 RX ORDER — MORPHINE SULFATE 10 MG/5ML
2.5 SOLUTION ORAL EVERY 4 HOURS PRN
Status: DISCONTINUED | OUTPATIENT
Start: 2023-01-01 | End: 2023-01-01

## 2023-01-01 RX ORDER — ONDANSETRON 2 MG/ML
4 INJECTION INTRAMUSCULAR; INTRAVENOUS ONCE
Status: COMPLETED | OUTPATIENT
Start: 2023-01-01 | End: 2023-01-01

## 2023-01-01 RX ORDER — THIAMINE HYDROCHLORIDE 100 MG/ML
100 INJECTION, SOLUTION INTRAMUSCULAR; INTRAVENOUS DAILY
Status: DISCONTINUED | OUTPATIENT
Start: 2023-01-01 | End: 2023-01-01

## 2023-01-01 RX ORDER — MORPHINE SULFATE 2 MG/ML
1 INJECTION, SOLUTION INTRAMUSCULAR; INTRAVENOUS ONCE
Status: COMPLETED | OUTPATIENT
Start: 2023-01-01 | End: 2023-01-01

## 2023-01-01 RX ORDER — LORAZEPAM 2 MG/ML
1 CONCENTRATE ORAL
Status: DISCONTINUED | OUTPATIENT
Start: 2023-01-01 | End: 2023-01-01 | Stop reason: HOSPADM

## 2023-01-01 RX ORDER — ONDANSETRON 4 MG/1
4 TABLET, ORALLY DISINTEGRATING ORAL EVERY 6 HOURS PRN
Status: DISCONTINUED | OUTPATIENT
Start: 2023-01-01 | End: 2023-01-01 | Stop reason: HOSPADM

## 2023-01-01 RX ORDER — ACETAMINOPHEN 325 MG/1
650 TABLET ORAL EVERY 6 HOURS PRN
Status: DISCONTINUED | OUTPATIENT
Start: 2023-01-01 | End: 2023-01-01

## 2023-01-01 RX ORDER — ONDANSETRON 2 MG/ML
4 INJECTION INTRAMUSCULAR; INTRAVENOUS ONCE
Status: DISCONTINUED | OUTPATIENT
Start: 2023-01-01 | End: 2023-01-01

## 2023-01-01 RX ORDER — MAGNESIUM HYDROXIDE/ALUMINUM HYDROXICE/SIMETHICONE 120; 1200; 1200 MG/30ML; MG/30ML; MG/30ML
30 SUSPENSION ORAL EVERY 6 HOURS PRN
Status: DISCONTINUED | OUTPATIENT
Start: 2023-01-01 | End: 2023-01-01 | Stop reason: HOSPADM

## 2023-01-01 RX ORDER — HEPARIN SODIUM 5000 [USP'U]/ML
5000 INJECTION, SOLUTION INTRAVENOUS; SUBCUTANEOUS 2 TIMES DAILY
Status: DISCONTINUED | OUTPATIENT
Start: 2023-01-01 | End: 2023-01-01

## 2023-01-01 RX ORDER — SODIUM CHLORIDE 9 MG/ML
INJECTION, SOLUTION INTRAVENOUS PRN
Status: DISCONTINUED | OUTPATIENT
Start: 2023-01-01 | End: 2023-01-01

## 2023-01-01 RX ORDER — LORAZEPAM 2 MG/ML
1 CONCENTRATE ORAL
Status: DISCONTINUED | OUTPATIENT
Start: 2023-01-01 | End: 2023-01-01

## 2023-01-01 RX ORDER — ACETAMINOPHEN 650 MG/1
650 SUPPOSITORY RECTAL EVERY 6 HOURS PRN
Status: DISCONTINUED | OUTPATIENT
Start: 2023-01-01 | End: 2023-01-01 | Stop reason: HOSPADM

## 2023-01-01 RX ORDER — MORPHINE SULFATE 10 MG/5ML
2.5 SOLUTION ORAL 3 TIMES DAILY
Status: DISCONTINUED | OUTPATIENT
Start: 2023-01-01 | End: 2023-01-01 | Stop reason: HOSPADM

## 2023-01-01 RX ORDER — MORPHINE SULFATE 2 MG/ML
0.5 INJECTION, SOLUTION INTRAMUSCULAR; INTRAVENOUS
Status: COMPLETED | OUTPATIENT
Start: 2023-01-01 | End: 2023-01-01

## 2023-01-01 RX ORDER — POLYETHYLENE GLYCOL 3350 17 G/17G
17 POWDER, FOR SOLUTION ORAL DAILY PRN
Status: DISCONTINUED | OUTPATIENT
Start: 2023-01-01 | End: 2023-01-01

## 2023-01-01 RX ORDER — BISACODYL 10 MG
10 SUPPOSITORY, RECTAL RECTAL DAILY PRN
Status: DISCONTINUED | OUTPATIENT
Start: 2023-01-01 | End: 2023-01-01 | Stop reason: HOSPADM

## 2023-01-01 RX ORDER — ONDANSETRON 2 MG/ML
4 INJECTION INTRAMUSCULAR; INTRAVENOUS EVERY 6 HOURS PRN
Status: DISCONTINUED | OUTPATIENT
Start: 2023-01-01 | End: 2023-01-01

## 2023-01-01 RX ORDER — MORPHINE SULFATE 10 MG/5ML
2.5 SOLUTION ORAL 3 TIMES DAILY
Status: DISCONTINUED | OUTPATIENT
Start: 2023-01-01 | End: 2023-01-01

## 2023-01-01 RX ORDER — SODIUM CHLORIDE, SODIUM LACTATE, POTASSIUM CHLORIDE, AND CALCIUM CHLORIDE .6; .31; .03; .02 G/100ML; G/100ML; G/100ML; G/100ML
1000 INJECTION, SOLUTION INTRAVENOUS ONCE
Status: COMPLETED | OUTPATIENT
Start: 2023-01-01 | End: 2023-01-01

## 2023-01-01 RX ORDER — MORPHINE SULFATE 20 MG/ML
5 SOLUTION ORAL
Status: DISCONTINUED | OUTPATIENT
Start: 2023-01-01 | End: 2023-01-01

## 2023-01-01 RX ORDER — SCOLOPAMINE TRANSDERMAL SYSTEM 1 MG/1
1 PATCH, EXTENDED RELEASE TRANSDERMAL
Status: DISCONTINUED | OUTPATIENT
Start: 2023-01-01 | End: 2023-01-01 | Stop reason: HOSPADM

## 2023-01-01 RX ORDER — SODIUM CHLORIDE 0.9 % (FLUSH) 0.9 %
5-40 SYRINGE (ML) INJECTION EVERY 12 HOURS SCHEDULED
Status: DISCONTINUED | OUTPATIENT
Start: 2023-01-01 | End: 2023-01-01

## 2023-01-01 RX ORDER — SODIUM CHLORIDE, SODIUM LACTATE, POTASSIUM CHLORIDE, CALCIUM CHLORIDE 600; 310; 30; 20 MG/100ML; MG/100ML; MG/100ML; MG/100ML
INJECTION, SOLUTION INTRAVENOUS CONTINUOUS
Status: DISCONTINUED | OUTPATIENT
Start: 2023-01-01 | End: 2023-01-01

## 2023-01-01 RX ORDER — FUROSEMIDE 10 MG/ML
20 INJECTION INTRAMUSCULAR; INTRAVENOUS ONCE
Status: COMPLETED | OUTPATIENT
Start: 2023-01-01 | End: 2023-01-01

## 2023-01-01 RX ORDER — SODIUM CHLORIDE 0.9 % (FLUSH) 0.9 %
5-40 SYRINGE (ML) INJECTION PRN
Status: DISCONTINUED | OUTPATIENT
Start: 2023-01-01 | End: 2023-01-01

## 2023-01-01 RX ORDER — MORPHINE SULFATE 20 MG/ML
2.5 SOLUTION ORAL
Status: DISCONTINUED | OUTPATIENT
Start: 2023-01-01 | End: 2023-01-01 | Stop reason: HOSPADM

## 2023-01-01 RX ADMIN — HEPARIN SODIUM 5000 UNITS: 5000 INJECTION INTRAVENOUS; SUBCUTANEOUS at 09:07

## 2023-01-01 RX ADMIN — IRON SUCROSE 200 MG: 20 INJECTION, SOLUTION INTRAVENOUS at 08:42

## 2023-01-01 RX ADMIN — MORPHINE SULFATE 2.5 MG: 100 SOLUTION ORAL at 10:05

## 2023-01-01 RX ADMIN — FUROSEMIDE 20 MG: 10 INJECTION, SOLUTION INTRAMUSCULAR; INTRAVENOUS at 19:16

## 2023-01-01 RX ADMIN — MORPHINE SULFATE 0.5 MG: 2 INJECTION, SOLUTION INTRAMUSCULAR; INTRAVENOUS at 09:05

## 2023-01-01 RX ADMIN — MORPHINE SULFATE 2.5 MG: 10 SOLUTION ORAL at 14:53

## 2023-01-01 RX ADMIN — MORPHINE SULFATE 2.5 MG: 10 SOLUTION ORAL at 08:25

## 2023-01-01 RX ADMIN — HEPARIN SODIUM 5000 UNITS: 5000 INJECTION INTRAVENOUS; SUBCUTANEOUS at 21:38

## 2023-01-01 RX ADMIN — MORPHINE SULFATE 2.5 MG: 10 SOLUTION ORAL at 10:14

## 2023-01-01 RX ADMIN — SODIUM CHLORIDE, PRESERVATIVE FREE 10 ML: 5 INJECTION INTRAVENOUS at 09:26

## 2023-01-01 RX ADMIN — MORPHINE SULFATE 2.5 MG: 10 SOLUTION ORAL at 20:43

## 2023-01-01 RX ADMIN — MORPHINE SULFATE 1 MG: 2 INJECTION, SOLUTION INTRAMUSCULAR; INTRAVENOUS at 01:45

## 2023-01-01 RX ADMIN — MORPHINE SULFATE 2.5 MG: 10 SOLUTION ORAL at 16:19

## 2023-01-01 RX ADMIN — MORPHINE SULFATE 2.5 MG: 10 SOLUTION ORAL at 20:51

## 2023-01-01 RX ADMIN — MORPHINE SULFATE 2.5 MG: 10 SOLUTION ORAL at 09:38

## 2023-01-01 RX ADMIN — THIAMINE HYDROCHLORIDE 100 MG: 100 INJECTION, SOLUTION INTRAMUSCULAR; INTRAVENOUS at 19:13

## 2023-01-01 RX ADMIN — IRON SUCROSE 200 MG: 20 INJECTION, SOLUTION INTRAVENOUS at 09:07

## 2023-01-01 RX ADMIN — SODIUM CHLORIDE, POTASSIUM CHLORIDE, SODIUM LACTATE AND CALCIUM CHLORIDE 1000 ML: 600; 310; 30; 20 INJECTION, SOLUTION INTRAVENOUS at 21:24

## 2023-01-01 RX ADMIN — MORPHINE SULFATE 2.5 MG: 10 SOLUTION ORAL at 15:04

## 2023-01-01 RX ADMIN — THIAMINE HYDROCHLORIDE 100 MG: 100 INJECTION, SOLUTION INTRAMUSCULAR; INTRAVENOUS at 09:07

## 2023-01-01 RX ADMIN — MORPHINE SULFATE 2.5 MG: 10 SOLUTION ORAL at 22:08

## 2023-01-01 RX ADMIN — MORPHINE SULFATE 2.5 MG: 10 SOLUTION ORAL at 15:59

## 2023-01-01 RX ADMIN — MORPHINE SULFATE 2.5 MG: 10 SOLUTION ORAL at 21:52

## 2023-01-01 RX ADMIN — MORPHINE SULFATE 2.5 MG: 10 SOLUTION ORAL at 21:13

## 2023-01-01 RX ADMIN — MORPHINE SULFATE 2.5 MG: 10 SOLUTION ORAL at 21:46

## 2023-01-01 RX ADMIN — SODIUM CHLORIDE, PRESERVATIVE FREE 10 ML: 5 INJECTION INTRAVENOUS at 09:38

## 2023-01-01 RX ADMIN — SODIUM CHLORIDE, PRESERVATIVE FREE 10 ML: 5 INJECTION INTRAVENOUS at 20:06

## 2023-01-01 RX ADMIN — MORPHINE SULFATE 2.5 MG: 10 SOLUTION ORAL at 15:32

## 2023-01-01 RX ADMIN — SODIUM CHLORIDE, SODIUM LACTATE, POTASSIUM CHLORIDE, AND CALCIUM CHLORIDE 1000 ML: 600; 310; 30; 20 INJECTION, SOLUTION INTRAVENOUS at 17:44

## 2023-01-01 RX ADMIN — ONDANSETRON 4 MG: 2 INJECTION INTRAMUSCULAR; INTRAVENOUS at 21:25

## 2023-01-01 RX ADMIN — SODIUM CHLORIDE, PRESERVATIVE FREE 10 ML: 5 INJECTION INTRAVENOUS at 21:26

## 2023-01-01 RX ADMIN — HYOSCYAMINE SULFATE 125 MCG: 0.12 TABLET ORAL; SUBLINGUAL at 03:44

## 2023-01-01 RX ADMIN — THIAMINE HYDROCHLORIDE 100 MG: 100 INJECTION, SOLUTION INTRAMUSCULAR; INTRAVENOUS at 08:41

## 2023-01-01 RX ADMIN — HYOSCYAMINE SULFATE 125 MCG: 0.12 TABLET ORAL; SUBLINGUAL at 08:24

## 2023-01-01 RX ADMIN — SODIUM CHLORIDE, PRESERVATIVE FREE 10 ML: 5 INJECTION INTRAVENOUS at 21:25

## 2023-01-01 RX ADMIN — MORPHINE SULFATE 2.5 MG: 10 SOLUTION ORAL at 09:37

## 2023-01-01 RX ADMIN — MORPHINE SULFATE 2.5 MG: 10 SOLUTION ORAL at 15:39

## 2023-01-01 RX ADMIN — MORPHINE SULFATE 2.5 MG: 10 SOLUTION ORAL at 08:24

## 2023-01-01 RX ADMIN — SODIUM CHLORIDE, PRESERVATIVE FREE 10 ML: 5 INJECTION INTRAVENOUS at 08:42

## 2023-01-01 RX ADMIN — SODIUM CHLORIDE, PRESERVATIVE FREE 10 ML: 5 INJECTION INTRAVENOUS at 21:50

## 2023-01-01 RX ADMIN — SODIUM CHLORIDE, PRESERVATIVE FREE 10 ML: 5 INJECTION INTRAVENOUS at 09:05

## 2023-01-01 RX ADMIN — GLYCOPYRROLATE 0.2 MG: 0.2 INJECTION INTRAMUSCULAR; INTRAVENOUS at 05:16

## 2023-01-01 RX ADMIN — MORPHINE SULFATE 2.5 MG: 10 SOLUTION ORAL at 18:00

## 2023-01-01 RX ADMIN — MORPHINE SULFATE 2.5 MG: 10 SOLUTION ORAL at 08:01

## 2023-01-01 RX ADMIN — MORPHINE SULFATE 5 MG: 100 SOLUTION ORAL at 03:05

## 2023-01-01 RX ADMIN — MORPHINE SULFATE 2.5 MG: 10 SOLUTION ORAL at 14:00

## 2023-01-01 RX ADMIN — MORPHINE SULFATE 2.5 MG: 10 SOLUTION ORAL at 21:51

## 2023-01-01 ASSESSMENT — PAIN SCALES - WONG BAKER
WONGBAKER_NUMERICALRESPONSE: 0
WONGBAKER_NUMERICALRESPONSE: 2
WONGBAKER_NUMERICALRESPONSE: 0
WONGBAKER_NUMERICALRESPONSE: 6
WONGBAKER_NUMERICALRESPONSE: 2
WONGBAKER_NUMERICALRESPONSE: 0

## 2023-01-01 ASSESSMENT — PAIN DESCRIPTION - LOCATION
LOCATION: GENERALIZED

## 2023-01-01 ASSESSMENT — PAIN - FUNCTIONAL ASSESSMENT
PAIN_FUNCTIONAL_ASSESSMENT: ADULT NONVERBAL PAIN SCALE (NPVS)
PAIN_FUNCTIONAL_ASSESSMENT: NONE - DENIES PAIN
PAIN_FUNCTIONAL_ASSESSMENT: ACTIVITIES ARE NOT PREVENTED
PAIN_FUNCTIONAL_ASSESSMENT: ADULT NONVERBAL PAIN SCALE (NPVS)
PAIN_FUNCTIONAL_ASSESSMENT: ACTIVITIES ARE NOT PREVENTED
PAIN_FUNCTIONAL_ASSESSMENT: ACTIVITIES ARE NOT PREVENTED

## 2023-01-01 ASSESSMENT — PAIN DESCRIPTION - DESCRIPTORS
DESCRIPTORS: PATIENT UNABLE TO DESCRIBE
DESCRIPTORS: PATIENT UNABLE TO DESCRIBE
DESCRIPTORS: PATIENT UNABLE TO DESCRIBE;OTHER (COMMENT)
DESCRIPTORS: PATIENT UNABLE TO DESCRIBE;OTHER (COMMENT)
DESCRIPTORS: PATIENT UNABLE TO DESCRIBE
DESCRIPTORS: DISCOMFORT

## 2023-01-01 ASSESSMENT — PAIN SCALES - GENERAL
PAINLEVEL_OUTOF10: 0
PAINLEVEL_OUTOF10: 4
PAINLEVEL_OUTOF10: 0
PAINLEVEL_OUTOF10: 2
PAINLEVEL_OUTOF10: 0
PAINLEVEL_OUTOF10: 0

## 2023-01-01 ASSESSMENT — LIFESTYLE VARIABLES
HOW MANY STANDARD DRINKS CONTAINING ALCOHOL DO YOU HAVE ON A TYPICAL DAY: 5 OR 6
HOW OFTEN DO YOU HAVE A DRINK CONTAINING ALCOHOL: 4 OR MORE TIMES A WEEK

## 2023-06-06 ENCOUNTER — HOSPITAL ENCOUNTER (OUTPATIENT)
Facility: HOSPITAL | Age: 88
Setting detail: SPECIMEN
Discharge: HOME | End: 2023-06-09
Payer: MEDICARE

## 2023-06-06 PROCEDURE — 88305 TISSUE EXAM BY PATHOLOGIST: CPT

## 2023-06-14 PROBLEM — D50.9 IRON DEFICIENCY ANEMIA: Status: ACTIVE | Noted: 2023-01-01

## 2023-06-14 PROBLEM — F32.2 MAJOR DEPRESSIVE DISORDER, SINGLE EPISODE, SEVERE WITHOUT PSYCHOTIC FEATURES (HCC): Status: ACTIVE | Noted: 2021-05-28

## 2023-06-14 PROBLEM — R41.82 AMS (ALTERED MENTAL STATUS): Status: ACTIVE | Noted: 2023-01-01

## 2023-06-14 PROBLEM — R33.9 URINARY RETENTION: Status: ACTIVE | Noted: 2023-01-01

## 2023-06-14 PROBLEM — N17.9 AKI (ACUTE KIDNEY INJURY) (HCC): Status: ACTIVE | Noted: 2023-01-01

## 2023-06-14 PROBLEM — G93.41 ACUTE METABOLIC ENCEPHALOPATHY: Status: ACTIVE | Noted: 2021-05-25

## 2023-06-16 PROBLEM — Z71.89 DNR (DO NOT RESUSCITATE) DISCUSSION: Status: ACTIVE | Noted: 2023-01-01

## 2023-06-16 PROBLEM — E43 SEVERE PROTEIN-CALORIE MALNUTRITION (HCC): Status: ACTIVE | Noted: 2021-05-26

## 2023-06-16 PROBLEM — Z71.89 GOALS OF CARE, COUNSELING/DISCUSSION: Status: ACTIVE | Noted: 2023-01-01

## 2023-06-16 PROBLEM — Z51.5 PALLIATIVE CARE ENCOUNTER: Status: ACTIVE | Noted: 2023-01-01

## 2023-06-20 PROBLEM — Z51.5 COMFORT MEASURES ONLY STATUS: Status: ACTIVE | Noted: 2023-01-01

## 2023-06-21 PROBLEM — R53.81 DEBILITY: Status: ACTIVE | Noted: 2023-01-01

## 2023-06-21 PROBLEM — Z85.3 HISTORY OF BREAST CANCER: Status: ACTIVE | Noted: 2023-01-01

## 2023-06-21 PROBLEM — Z86.59 HISTORY OF DEPRESSION: Status: ACTIVE | Noted: 2023-01-01

## 2023-07-03 NOTE — PROGRESS NOTES
Physician Progress Note      PATIENTAlida Been  CSN #:                  [id number]  :                       10/23/1931  ADMIT DATE:       2023 3:13 PM  [address] [place] [address] DATE:        2023 10:38 AM  RESPONDING  PROVIDER #:        Dayna Rucker MD          QUERY TEXT:    Pt admitted with AMS due to benzodiazepine overdose. On , Pt noted to   have tachypnea RR 36, desats to mid 70's on RA and required NRB mask. If   possible, please document in the progress notes and discharge summary if you   are evaluating and/or treating any of the following: The medical record reflects the following:  Risk Factors: overdose of benzodiazepine  Clinical Indicators: Per ER physician: RR 36,    Effort: Tachypnea present. Breath sounds: No wheezing or rhonchi. Comments: Coarse breath sounds in the bilateral bases but otherwise clear. Per ER Nurse:  observed by ED techs on staff that the patient began to desat   into the low to mid 70s on RA. RN was immediately alerted as patient was also   found to have increased secretions at this time. RN preformed deep suction   with ED provider bedside. Oxygen saturations increased post suction and NRB. ABG: PH:7.33  pCO2: 54.4   pO2: 148  HCO3: 28.5  So2: 99.1% on 100% Fio2 via   NRB mask    Treatment: NRB mask, monitor ABG's, monitor mental status, palliative care,   comfort care measures    Thank you  LASHONDA ParkerN, RN, CCDS  CDI  [email or web address]  Options provided:  -- Acute respiratory failure with hypoxia  -- Acute respiratory failure with hypercapnia  -- Acute respiratory failure with hypoxia and hypercapnia  -- Other - I will add my own diagnosis  -- Disagree - Not applicable / Not valid  -- Disagree - Clinically unable to determine / Unknown  -- Refer to Clinical Documentation Reviewer    PROVIDER RESPONSE TEXT:    This patient is in acute respiratory failure with hypoxia and hypercapnia.     Query created by: Fede Merida on